# Patient Record
Sex: FEMALE | Race: WHITE | NOT HISPANIC OR LATINO | Employment: FULL TIME
[De-identification: names, ages, dates, MRNs, and addresses within clinical notes are randomized per-mention and may not be internally consistent; named-entity substitution may affect disease eponyms.]

---

## 2017-07-08 ENCOUNTER — HEALTH MAINTENANCE LETTER (OUTPATIENT)
Age: 57
End: 2017-07-08

## 2019-10-05 ENCOUNTER — HEALTH MAINTENANCE LETTER (OUTPATIENT)
Age: 59
End: 2019-10-05

## 2020-11-14 ENCOUNTER — HEALTH MAINTENANCE LETTER (OUTPATIENT)
Age: 60
End: 2020-11-14

## 2021-09-12 ENCOUNTER — HEALTH MAINTENANCE LETTER (OUTPATIENT)
Age: 61
End: 2021-09-12

## 2021-11-07 ENCOUNTER — HEALTH MAINTENANCE LETTER (OUTPATIENT)
Age: 61
End: 2021-11-07

## 2022-01-02 ENCOUNTER — HEALTH MAINTENANCE LETTER (OUTPATIENT)
Age: 62
End: 2022-01-02

## 2022-11-19 ENCOUNTER — HEALTH MAINTENANCE LETTER (OUTPATIENT)
Age: 62
End: 2022-11-19

## 2023-04-09 ENCOUNTER — HEALTH MAINTENANCE LETTER (OUTPATIENT)
Age: 63
End: 2023-04-09

## 2023-11-18 ENCOUNTER — HEALTH MAINTENANCE LETTER (OUTPATIENT)
Age: 63
End: 2023-11-18

## 2024-06-14 ENCOUNTER — TRANSFERRED RECORDS (OUTPATIENT)
Dept: MULTI SPECIALTY CLINIC | Facility: CLINIC | Age: 64
End: 2024-06-14

## 2024-06-14 LAB — RETINOPATHY: NORMAL

## 2024-07-07 ENCOUNTER — HOSPITAL ENCOUNTER (EMERGENCY)
Facility: HOSPITAL | Age: 64
Discharge: HOME | End: 2024-07-07
Attending: EMERGENCY MEDICINE | Admitting: EMERGENCY MEDICINE
Payer: COMMERCIAL

## 2024-07-07 ENCOUNTER — APPOINTMENT (OUTPATIENT)
Dept: CT IMAGING | Facility: HOSPITAL | Age: 64
End: 2024-07-07
Attending: EMERGENCY MEDICINE
Payer: COMMERCIAL

## 2024-07-07 VITALS
HEIGHT: 69 IN | WEIGHT: 272.1 LBS | RESPIRATION RATE: 18 BRPM | BODY MASS INDEX: 40.3 KG/M2 | OXYGEN SATURATION: 99 % | HEART RATE: 75 BPM | TEMPERATURE: 96.8 F | DIASTOLIC BLOOD PRESSURE: 80 MMHG | SYSTOLIC BLOOD PRESSURE: 159 MMHG

## 2024-07-07 DIAGNOSIS — R10.9 RIGHT FLANK PAIN: ICD-10-CM

## 2024-07-07 DIAGNOSIS — K86.89 PANCREATIC MASS: ICD-10-CM

## 2024-07-07 DIAGNOSIS — N20.1 URETERAL STONE: ICD-10-CM

## 2024-07-07 DIAGNOSIS — E11.9 TYPE 2 DIABETES MELLITUS WITHOUT COMPLICATION, WITHOUT LONG-TERM CURRENT USE OF INSULIN (H): ICD-10-CM

## 2024-07-07 LAB
ALBUMIN SERPL BCG-MCNC: 4.2 G/DL (ref 3.5–5.2)
ALBUMIN UR-MCNC: 50 MG/DL
ALP SERPL-CCNC: 76 U/L (ref 40–150)
ALT SERPL W P-5'-P-CCNC: 18 U/L (ref 0–50)
AMORPH CRY #/AREA URNS HPF: ABNORMAL /HPF
ANION GAP SERPL CALCULATED.3IONS-SCNC: 15 MMOL/L (ref 7–15)
APPEARANCE UR: ABNORMAL
AST SERPL W P-5'-P-CCNC: 17 U/L (ref 0–45)
BASOPHILS # BLD AUTO: 0.1 10E3/UL (ref 0–0.2)
BASOPHILS NFR BLD AUTO: 1 %
BILIRUB DIRECT SERPL-MCNC: <0.2 MG/DL (ref 0–0.3)
BILIRUB SERPL-MCNC: 0.5 MG/DL
BILIRUB UR QL STRIP: NEGATIVE
BUN SERPL-MCNC: 14.8 MG/DL (ref 8–23)
CALCIUM SERPL-MCNC: 9.3 MG/DL (ref 8.8–10.2)
CHLORIDE SERPL-SCNC: 104 MMOL/L (ref 98–107)
COLOR UR AUTO: YELLOW
CREAT SERPL-MCNC: 1.07 MG/DL (ref 0.51–0.95)
DEPRECATED HCO3 PLAS-SCNC: 22 MMOL/L (ref 22–29)
EGFRCR SERPLBLD CKD-EPI 2021: 58 ML/MIN/1.73M2
EOSINOPHIL # BLD AUTO: 0 10E3/UL (ref 0–0.7)
EOSINOPHIL NFR BLD AUTO: 0 %
ERYTHROCYTE [DISTWIDTH] IN BLOOD BY AUTOMATED COUNT: 13.5 % (ref 10–15)
GLUCOSE SERPL-MCNC: 316 MG/DL (ref 70–99)
GLUCOSE UR STRIP-MCNC: >1000 MG/DL
HBA1C MFR BLD: 8.7 %
HCT VFR BLD AUTO: 44.4 % (ref 35–47)
HGB BLD-MCNC: 14.9 G/DL (ref 11.7–15.7)
HGB UR QL STRIP: NEGATIVE
HYALINE CASTS: 10 /LPF
IMM GRANULOCYTES # BLD: 0.1 10E3/UL
IMM GRANULOCYTES NFR BLD: 1 %
KETONES UR STRIP-MCNC: NEGATIVE MG/DL
LEUKOCYTE ESTERASE UR QL STRIP: NEGATIVE
LIPASE SERPL-CCNC: 32 U/L (ref 13–60)
LYMPHOCYTES # BLD AUTO: 1 10E3/UL (ref 0.8–5.3)
LYMPHOCYTES NFR BLD AUTO: 9 %
MCH RBC QN AUTO: 30.3 PG (ref 26.5–33)
MCHC RBC AUTO-ENTMCNC: 33.6 G/DL (ref 31.5–36.5)
MCV RBC AUTO: 90 FL (ref 78–100)
MONOCYTES # BLD AUTO: 0.3 10E3/UL (ref 0–1.3)
MONOCYTES NFR BLD AUTO: 2 %
MUCOUS THREADS #/AREA URNS LPF: PRESENT /LPF
NEUTROPHILS # BLD AUTO: 10.7 10E3/UL (ref 1.6–8.3)
NEUTROPHILS NFR BLD AUTO: 88 %
NITRATE UR QL: NEGATIVE
NRBC # BLD AUTO: 0 10E3/UL
NRBC BLD AUTO-RTO: 0 /100
PH UR STRIP: 5.5 [PH] (ref 5–7)
PLATELET # BLD AUTO: 356 10E3/UL (ref 150–450)
POTASSIUM SERPL-SCNC: 4.2 MMOL/L (ref 3.4–5.3)
PROT SERPL-MCNC: 7.3 G/DL (ref 6.4–8.3)
RBC # BLD AUTO: 4.91 10E6/UL (ref 3.8–5.2)
RBC URINE: 0 /HPF
SODIUM SERPL-SCNC: 141 MMOL/L (ref 135–145)
SP GR UR STRIP: 1.03 (ref 1–1.03)
SQUAMOUS EPITHELIAL: 1 /HPF
UROBILINOGEN UR STRIP-MCNC: <2 MG/DL
WBC # BLD AUTO: 12.2 10E3/UL (ref 4–11)
WBC URINE: 0 /HPF

## 2024-07-07 PROCEDURE — 96375 TX/PRO/DX INJ NEW DRUG ADDON: CPT

## 2024-07-07 PROCEDURE — 80048 BASIC METABOLIC PNL TOTAL CA: CPT | Performed by: STUDENT IN AN ORGANIZED HEALTH CARE EDUCATION/TRAINING PROGRAM

## 2024-07-07 PROCEDURE — 74177 CT ABD & PELVIS W/CONTRAST: CPT

## 2024-07-07 PROCEDURE — 250N000011 HC RX IP 250 OP 636: Performed by: EMERGENCY MEDICINE

## 2024-07-07 PROCEDURE — 85025 COMPLETE CBC W/AUTO DIFF WBC: CPT | Performed by: EMERGENCY MEDICINE

## 2024-07-07 PROCEDURE — 80053 COMPREHEN METABOLIC PANEL: CPT | Performed by: STUDENT IN AN ORGANIZED HEALTH CARE EDUCATION/TRAINING PROGRAM

## 2024-07-07 PROCEDURE — 99285 EMERGENCY DEPT VISIT HI MDM: CPT | Mod: 25

## 2024-07-07 PROCEDURE — 81001 URINALYSIS AUTO W/SCOPE: CPT | Performed by: EMERGENCY MEDICINE

## 2024-07-07 PROCEDURE — 250N000013 HC RX MED GY IP 250 OP 250 PS 637: Performed by: EMERGENCY MEDICINE

## 2024-07-07 PROCEDURE — 80048 BASIC METABOLIC PNL TOTAL CA: CPT | Performed by: EMERGENCY MEDICINE

## 2024-07-07 PROCEDURE — 82248 BILIRUBIN DIRECT: CPT | Performed by: EMERGENCY MEDICINE

## 2024-07-07 PROCEDURE — 96374 THER/PROPH/DIAG INJ IV PUSH: CPT | Mod: 59

## 2024-07-07 PROCEDURE — 96361 HYDRATE IV INFUSION ADD-ON: CPT

## 2024-07-07 PROCEDURE — 36415 COLL VENOUS BLD VENIPUNCTURE: CPT | Performed by: STUDENT IN AN ORGANIZED HEALTH CARE EDUCATION/TRAINING PROGRAM

## 2024-07-07 PROCEDURE — 258N000003 HC RX IP 258 OP 636: Performed by: EMERGENCY MEDICINE

## 2024-07-07 PROCEDURE — 83036 HEMOGLOBIN GLYCOSYLATED A1C: CPT | Performed by: EMERGENCY MEDICINE

## 2024-07-07 PROCEDURE — 85025 COMPLETE CBC W/AUTO DIFF WBC: CPT | Performed by: STUDENT IN AN ORGANIZED HEALTH CARE EDUCATION/TRAINING PROGRAM

## 2024-07-07 PROCEDURE — 83690 ASSAY OF LIPASE: CPT | Performed by: EMERGENCY MEDICINE

## 2024-07-07 RX ORDER — METHOCARBAMOL 500 MG/1
500 TABLET, FILM COATED ORAL ONCE
Status: COMPLETED | OUTPATIENT
Start: 2024-07-07 | End: 2024-07-07

## 2024-07-07 RX ORDER — DIMENHYDRINATE 50 MG
50 TABLET ORAL ONCE
Status: COMPLETED | OUTPATIENT
Start: 2024-07-07 | End: 2024-07-07

## 2024-07-07 RX ORDER — DIMENHYDRINATE 50 MG
50 TABLET ORAL AT BEDTIME
Qty: 7 TABLET | Refills: 0 | Status: SHIPPED | OUTPATIENT
Start: 2024-07-07 | End: 2024-07-11

## 2024-07-07 RX ORDER — ACETAMINOPHEN 500 MG
1000 TABLET ORAL EVERY 6 HOURS
Qty: 56 TABLET | Refills: 0 | Status: SHIPPED | OUTPATIENT
Start: 2024-07-07 | End: 2024-08-07

## 2024-07-07 RX ORDER — IBUPROFEN 200 MG
400 TABLET ORAL EVERY 6 HOURS
Qty: 56 TABLET | Refills: 0 | Status: SHIPPED | OUTPATIENT
Start: 2024-07-07 | End: 2024-08-07

## 2024-07-07 RX ORDER — IOPAMIDOL 755 MG/ML
75 INJECTION, SOLUTION INTRAVASCULAR ONCE
Status: COMPLETED | OUTPATIENT
Start: 2024-07-07 | End: 2024-07-07

## 2024-07-07 RX ORDER — ONDANSETRON 2 MG/ML
4 INJECTION INTRAMUSCULAR; INTRAVENOUS ONCE
Status: COMPLETED | OUTPATIENT
Start: 2024-07-07 | End: 2024-07-07

## 2024-07-07 RX ORDER — ACETAMINOPHEN 325 MG/1
650 TABLET ORAL ONCE
Status: COMPLETED | OUTPATIENT
Start: 2024-07-07 | End: 2024-07-07

## 2024-07-07 RX ORDER — ACETAMINOPHEN 500 MG
1000 TABLET ORAL EVERY 6 HOURS
Qty: 56 TABLET | Refills: 0 | Status: SHIPPED | OUTPATIENT
Start: 2024-07-07 | End: 2024-07-07

## 2024-07-07 RX ORDER — DIMENHYDRINATE 50 MG
50 TABLET ORAL EVERY 6 HOURS PRN
Qty: 28 TABLET | Refills: 0 | Status: SHIPPED | OUTPATIENT
Start: 2024-07-07 | End: 2024-07-07

## 2024-07-07 RX ORDER — OXYCODONE HYDROCHLORIDE 5 MG/1
5 TABLET ORAL ONCE
Status: COMPLETED | OUTPATIENT
Start: 2024-07-07 | End: 2024-07-07

## 2024-07-07 RX ORDER — KETOROLAC TROMETHAMINE 15 MG/ML
15 INJECTION, SOLUTION INTRAMUSCULAR; INTRAVENOUS ONCE
Status: COMPLETED | OUTPATIENT
Start: 2024-07-07 | End: 2024-07-07

## 2024-07-07 RX ORDER — OXYCODONE HYDROCHLORIDE 5 MG/1
5 TABLET ORAL EVERY 6 HOURS PRN
Qty: 12 TABLET | Refills: 0 | Status: SHIPPED | OUTPATIENT
Start: 2024-07-07 | End: 2024-07-11

## 2024-07-07 RX ORDER — IBUPROFEN 200 MG
400 TABLET ORAL EVERY 6 HOURS
Qty: 56 TABLET | Refills: 0 | Status: SHIPPED | OUTPATIENT
Start: 2024-07-07 | End: 2024-07-07

## 2024-07-07 RX ORDER — DIMENHYDRINATE 50 MG
50 TABLET ORAL AT BEDTIME
Qty: 7 TABLET | Refills: 0 | Status: SHIPPED | OUTPATIENT
Start: 2024-07-07 | End: 2024-07-07

## 2024-07-07 RX ORDER — DIMENHYDRINATE 50 MG
50 TABLET ORAL EVERY 6 HOURS PRN
Qty: 28 TABLET | Refills: 0 | Status: SHIPPED | OUTPATIENT
Start: 2024-07-07 | End: 2024-07-11

## 2024-07-07 RX ADMIN — DIMENHYDRINATE 50 MG: 50 TABLET ORAL at 16:00

## 2024-07-07 RX ADMIN — IOPAMIDOL 75 ML: 755 INJECTION, SOLUTION INTRAVENOUS at 14:50

## 2024-07-07 RX ADMIN — ACETAMINOPHEN 650 MG: 325 TABLET ORAL at 16:00

## 2024-07-07 RX ADMIN — KETOROLAC TROMETHAMINE 15 MG: 15 INJECTION, SOLUTION INTRAMUSCULAR; INTRAVENOUS at 16:42

## 2024-07-07 RX ADMIN — OXYCODONE HYDROCHLORIDE 5 MG: 5 TABLET ORAL at 16:42

## 2024-07-07 RX ADMIN — METHOCARBAMOL 500 MG: 500 TABLET ORAL at 14:30

## 2024-07-07 RX ADMIN — SODIUM CHLORIDE 1000 ML: 9 INJECTION, SOLUTION INTRAVENOUS at 14:14

## 2024-07-07 RX ADMIN — ONDANSETRON 4 MG: 2 INJECTION INTRAMUSCULAR; INTRAVENOUS at 14:13

## 2024-07-07 ASSESSMENT — COLUMBIA-SUICIDE SEVERITY RATING SCALE - C-SSRS
6. HAVE YOU EVER DONE ANYTHING, STARTED TO DO ANYTHING, OR PREPARED TO DO ANYTHING TO END YOUR LIFE?: NO
2. HAVE YOU ACTUALLY HAD ANY THOUGHTS OF KILLING YOURSELF IN THE PAST MONTH?: NO
1. IN THE PAST MONTH, HAVE YOU WISHED YOU WERE DEAD OR WISHED YOU COULD GO TO SLEEP AND NOT WAKE UP?: NO

## 2024-07-07 ASSESSMENT — ACTIVITIES OF DAILY LIVING (ADL): ADLS_ACUITY_SCORE: 35

## 2024-07-07 NOTE — Clinical Note
Ingrid Oakes was seen and treated in our emergency department on 7/7/2024.  She may return to work on 07/15/2024.       If you have any questions or concerns, please don't hesitate to call.      Camille Posey MD

## 2024-07-07 NOTE — ED PROVIDER NOTES
EMERGENCY DEPARTMENT ENCOUNTER      NAME: Ingrid Oakes  AGE: 64 year old female  YOB: 1960  MRN: [id number]  EVALUATION DATE & TIME: No admission date for patient encounter.    PCP: No Ref-Primary, Physician    ED PROVIDER: Camille Posey M.D.      Chief Complaint   Patient presents with    Flank Pain         FINAL IMPRESSION:  1. Type 2 diabetes mellitus without complication, without long-term current use of insulin (H)    2. Right flank pain    3. Ureteral stone    4. Pancreatic mass          ED COURSE & MEDICAL DECISION MAKING:    ED Course as of 07/07/24 1637   Sun Jul 07, 2024   1325 Patient with right flank discomfort after cleaning, no MD f/u in about 15 years with morbid obesity, glucose 316 here in the ED without DKA with normal anion gap of 15 and CO2 22, I spoke with her about how this is apparen tdiabetes mellitus, she is familiar with pathophysiology of DM2 as her  has diabetes and she is educated re: the diagnosis, ok with plan to hydrate with IVF and begin metformin and reassess for tolerance, PMD referral. Okw ith plans to check LFT and lipase, CT abdomen and clinically reassess, UA when able to r/o UTI.   1344 Hemoglobin a1c 8.7 consistent with DM2, LFTs and lipase and UA underway   1504 UA without cells to suggest UTI reassuringly and LFTs and lipase nromal. Awaiting CT, which is underway   1554 CT with concerning pancreatic mass highly suspicious for neuroendocrine tumor, also passing ureteral stone which does explain right flank pain 2mm in distal right ureter. Dramamine and tylenol begun and will talk with patient now       Pertinent Labs & Imaging studies reviewed. (See chart for details)    N95 worn  A face shield was worn also  COVID PPE    Medical Decision Making  Obtained supplemental history:Supplemental history obtained?: No  Reviewed external records: External records reviewed?: No  Care impacted by chronic illness:Other: Obesity  Care significantly affected by  social determinants of health:Access to Medical Care  Did you consider but not order tests?: Work up considered but not performed and documented in chart, if applicable  Did you interpret images independently?: Independent interpretation of ECG and images noted in documentation, when applicable.  Consultation discussion with other provider:Did you involve another provider (consultant, , pharmacy, etc.)?: No  Discharge. I prescribed additional prescription strength medication(s) as charted. I considered admission, but discharged patient after significant clinical improvement.    At the conclusion of the encounter I discussed the results of all of the tests and the disposition. The questions were answered. The patient or family acknowledged understanding and was agreeable with the care plan.     MEDICATIONS GIVEN IN THE EMERGENCY:  Medications   metFORMIN (GLUCOPHAGE) tablet 500 mg (0 mg Oral Hold 7/7/24 1417)   ketorolac (TORADOL) injection 15 mg (has no administration in time range)   oxyCODONE (ROXICODONE) tablet 5 mg (has no administration in time range)   sodium chloride 0.9% BOLUS 1,000 mL (1,000 mLs Intravenous $New Bag 7/7/24 1414)   ondansetron (ZOFRAN) injection 4 mg (4 mg Intravenous $Given 7/7/24 1413)   methocarbamol (ROBAXIN) tablet 500 mg (500 mg Oral $Given 7/7/24 1430)   iopamidol (ISOVUE-370) solution 75 mL (75 mLs Intravenous $Given 7/7/24 1450)   acetaminophen (TYLENOL) tablet 650 mg (650 mg Oral $Given 7/7/24 1600)   dimenhyDRINATE (DRAMAMINE) tablet 50 mg (50 mg Oral $Given 7/7/24 1600)       NEW PRESCRIPTIONS STARTED AT TODAY'S ER VISIT  New Prescriptions    ACETAMINOPHEN (TYLENOL) 500 MG TABLET    Take 2 tablets (1,000 mg) by mouth every 6 hours    BLOOD GLUCOSE (NO BRAND SPECIFIED) TEST STRIP    Use to test blood sugar 2 times daily or as directed.    BLOOD GLUCOSE MONITORING (NO BRAND SPECIFIED) METER DEVICE KIT    Use to test blood sugar 2 times daily or as directed.    DIMENHYDRINATE  (DRAMAMINE) 50 MG TABLET    Take 1 tablet (50 mg) by mouth every 6 hours as needed for other (kidney stone pain management)    DIMENHYDRINATE (DRAMAMINE) 50 MG TABLET    Take 1 tablet (50 mg) by mouth at bedtime    IBUPROFEN (ADVIL/MOTRIN) 200 MG TABLET    Take 2 tablets (400 mg) by mouth every 6 hours    METFORMIN (GLUCOPHAGE) 500 MG TABLET    Take 1 tablet (500 mg) by mouth 2 times daily (with meals)    OXYCODONE (ROXICODONE) 5 MG TABLET    Take 1 tablet (5 mg) by mouth every 6 hours as needed for pain          =================================================================    HPI      Ingrid Oakes is a 64 year old female with PMHx of obesity, IBS who presents to the ED today via walk in with flank pain.     The patient reports she is just getting over covid from two weeks ago. She said this morning she developed low right sided flank pain, but believes it to be musculoskeletal in nature. She said she is unable to get rid of the pain, it is persistent-even after taking tylenol and ibuprofen. Sitting in the wheelchair is fine, but moving around makes her nauseas and diaphoretic. Pain is rated as 6/10. Patient with normal bowel movements today. Denies personal history of diabetes, as she has not seen a primary care provider in 15 years. Patient with family history of hypertension and diabetes.     Patient denies dysuria, hematuria, vomiting or fever.       REVIEW OF SYSTEMS   All other systems reviewed and are negative except as noted above in HPI.    PAST MEDICAL HISTORY:  History reviewed. No pertinent past medical history.    PAST SURGICAL HISTORY:  Past Surgical History:   Procedure Laterality Date    SURGICAL HISTORY OF -   1988    dermoid tumor bilateral ovaries    SURGICAL HISTORY OF -       c/section       CURRENT MEDICATIONS:    acetaminophen (TYLENOL) 500 MG tablet  blood glucose (NO BRAND SPECIFIED) test strip  blood glucose monitoring (NO BRAND SPECIFIED) meter device kit  dimenhyDRINATE (DRAMAMINE)  "50 MG tablet  dimenhyDRINATE (DRAMAMINE) 50 MG tablet  ibuprofen (ADVIL/MOTRIN) 200 MG tablet  metFORMIN (GLUCOPHAGE) 500 MG tablet  oxyCODONE (ROXICODONE) 5 MG tablet  Cholecalciferol (VITAMIN D PO)  SYNTHROID 150 MCG OR TABS  ZOLOFT 50 MG OR TABS  ZYRTEC 10 MG OR TABS        ALLERGIES:  Allergies   Allergen Reactions    Sulfa Antibiotics      Mother and brother allergic, she has not tried it       FAMILY HISTORY:  History reviewed. No pertinent family history.    SOCIAL HISTORY:   Social History     Socioeconomic History    Marital status:    Tobacco Use    Smoking status: Never   Substance and Sexual Activity    Alcohol use: Yes     Alcohol/week: 0.0 - 0.8 standard drinks of alcohol     Comment: per month    Drug use: No    Sexual activity: Yes     Partners: Male       VITALS:  Patient Vitals for the past 24 hrs:   BP Temp Pulse Resp SpO2 Height Weight   07/07/24 1556 (!) 157/78 -- 68 -- 100 % -- --   07/07/24 1225 (!) 153/78 96.8  F (36  C) 57 18 97 % 1.753 m (5' 9\") 123.4 kg (272 lb 1.6 oz)       PHYSICAL EXAM    GENERAL: Awake, alert.  In no acute distress.   HEENT: Normocephalic, atraumatic.  Pupils equal, round and reactive.  Conjunctiva normal.  EOMI.  NECK: No stridor or apparent deformity.  PULMONARY: Symmetrical breath sounds without distress.  Lungs clear to auscultation bilaterally without wheezes, rhonchi or rales.  CARDIO: Regular rate and rhythm.  No significant murmur, rub or gallop.  Radial pulses strong and symmetrical.  ABDOMINAL: Abdomen soft, non-distended, RUQ palpation reproduces right flank discomfort.  No CVAT, no palpable hepatosplenomegaly.  EXTREMITIES: No lower extremity swelling or edema.    NEURO: Alert and oriented to person, place and time.  Cranial nerves grossly intact.  No focal motor deficit.  PSYCH: Normal mood and affect  SKIN: No rashes      LAB:  All pertinent labs reviewed and interpreted.  Results for orders placed or performed during the hospital encounter of " 07/07/24   CT Abdomen Pelvis w Contrast    Impression    IMPRESSION:   1.  Enhancing mass of the pancreatic tail measuring 1.2 cm. Findings are concerning for a pancreatic neuroendocrine tumor. Recommend pancreatic MRI within one month.  2.  Obstructing 2 mm calculus in the right distal ureter at the ureterovesicular junction. There is mild right hydronephrosis and delayed enhancement of the right kidney.  3.  Hepatomegaly.   UA with Microscopic reflex to Culture    Specimen: Urine, Clean Catch   Result Value Ref Range    Color Urine Yellow Colorless, Straw, Light Yellow, Yellow    Appearance Urine Turbid (A) Clear    Glucose Urine >1000 (A) Negative mg/dL    Bilirubin Urine Negative Negative    Ketones Urine Negative Negative mg/dL    Specific Gravity Urine 1.030 1.001 - 1.030    Blood Urine Negative Negative    pH Urine 5.5 5.0 - 7.0    Protein Albumin Urine 50 (A) Negative mg/dL    Urobilinogen Urine <2.0 <2.0 mg/dL    Nitrite Urine Negative Negative    Leukocyte Esterase Urine Negative Negative    Mucus Urine Present (A) None Seen /LPF    Amorphous Crystals Urine Few (A) None Seen /HPF    RBC Urine 0 <=2 /HPF    WBC Urine 0 <=5 /HPF    Squamous Epithelials Urine 1 <=1 /HPF    Hyaline Casts Urine 10 (H) <=2 /LPF   Basic metabolic panel   Result Value Ref Range    Sodium 141 135 - 145 mmol/L    Potassium 4.2 3.4 - 5.3 mmol/L    Chloride 104 98 - 107 mmol/L    Carbon Dioxide (CO2) 22 22 - 29 mmol/L    Anion Gap 15 7 - 15 mmol/L    Urea Nitrogen 14.8 8.0 - 23.0 mg/dL    Creatinine 1.07 (H) 0.51 - 0.95 mg/dL    GFR Estimate 58 (L) >60 mL/min/1.73m2    Calcium 9.3 8.8 - 10.2 mg/dL    Glucose 316 (H) 70 - 99 mg/dL   CBC with platelets and differential   Result Value Ref Range    WBC Count 12.2 (H) 4.0 - 11.0 10e3/uL    RBC Count 4.91 3.80 - 5.20 10e6/uL    Hemoglobin 14.9 11.7 - 15.7 g/dL    Hematocrit 44.4 35.0 - 47.0 %    MCV 90 78 - 100 fL    MCH 30.3 26.5 - 33.0 pg    MCHC 33.6 31.5 - 36.5 g/dL    RDW 13.5 10.0 -  15.0 %    Platelet Count 356 150 - 450 10e3/uL    % Neutrophils 88 %    % Lymphocytes 9 %    % Monocytes 2 %    % Eosinophils 0 %    % Basophils 1 %    % Immature Granulocytes 1 %    NRBCs per 100 WBC 0 <1 /100    Absolute Neutrophils 10.7 (H) 1.6 - 8.3 10e3/uL    Absolute Lymphocytes 1.0 0.8 - 5.3 10e3/uL    Absolute Monocytes 0.3 0.0 - 1.3 10e3/uL    Absolute Eosinophils 0.0 0.0 - 0.7 10e3/uL    Absolute Basophils 0.1 0.0 - 0.2 10e3/uL    Absolute Immature Granulocytes 0.1 <=0.4 10e3/uL    Absolute NRBCs 0.0 10e3/uL   Result Value Ref Range    Hemoglobin A1C 8.7 (H) <5.7 %   Result Value Ref Range    Lipase 32 13 - 60 U/L   Hepatic function panel   Result Value Ref Range    Protein Total 7.3 6.4 - 8.3 g/dL    Albumin 4.2 3.5 - 5.2 g/dL    Bilirubin Total 0.5 <=1.2 mg/dL    Alkaline Phosphatase 76 40 - 150 U/L    AST 17 0 - 45 U/L    ALT 18 0 - 50 U/L    Bilirubin Direct <0.20 0.00 - 0.30 mg/dL       RADIOLOGY:  Reviewed all pertinent imaging. Please see official radiology report.  CT Abdomen Pelvis w Contrast   Final Result   IMPRESSION:    1.  Enhancing mass of the pancreatic tail measuring 1.2 cm. Findings are concerning for a pancreatic neuroendocrine tumor. Recommend pancreatic MRI within one month.   2.  Obstructing 2 mm calculus in the right distal ureter at the ureterovesicular junction. There is mild right hydronephrosis and delayed enhancement of the right kidney.   3.  Hepatomegaly.              I, Chen Villanueva, am serving as a scribe to document services personally performed by Dr. Camille Posey based on my observation and the provider's statements to me. I, Camlile Posey MD attest that Chen Villanueva is acting in a scribe capacity, has observed my performance of the services and has documented them in accordance with my direction.       Camille Posey MD  07/07/24 3475

## 2024-07-07 NOTE — ED TRIAGE NOTES
"Pt to triage via w/c c/o nonradiating right posterior flank pain since waking at 0900 with associated n/v. Pt reports subjective fever/chills, and reports urinating \"a lot\" but denies dysuria or hematuria. Pt states she was cleaning yesterday and thought maybe she pulled a muscle in her back but the pain seemed too severe.     Triage Assessment (Adult)       Row Name 07/07/24 2565          Triage Assessment    Airway WDL WDL        Respiratory WDL    Respiratory WDL WDL        Peripheral/Neurovascular WDL    Peripheral Neurovascular WDL WDL        Cognitive/Neuro/Behavioral WDL    Cognitive/Neuro/Behavioral WDL WDL                     "

## 2024-07-07 NOTE — DISCHARGE INSTRUCTIONS
You have a kidney stone and so we are giving you prescription medications to keep that pain low while your stone is passing and our kidney stone experts will call you to help you to set up an appointment with them and so they can make sure your stone passed successfully.    On the CAT scan we can see you have a pancreatic mass. I am worried this could be cancer. Our cancer specialists will call you tomorrow to help you to set up an appointment with them in their office in the next 2 days so they can talk to you about treatment options and what testing they want to start this week to help to determine what type of cancer this may be.    Please check your blood sugar once every morning and once every evening and write those numbers down so when you follow up with the primary care medical team they can make sure that over time your blood sugar numbers are improving while you are taking the metformin medication for your newly diagnosed diabetes. They may increase your metformin dose at their office. Our primary care medical team will call you to help you to set up a follow up appointment with them in their office within the next two weeks. I expect your blood sugar will be elevated initially while you are taking the metformin medication because it can take some time before it works well. If your blood sugar gets high over 500 please come to the ER so we can increase your medication dose.

## 2024-07-09 ENCOUNTER — TELEPHONE (OUTPATIENT)
Dept: GASTROENTEROLOGY | Facility: CLINIC | Age: 64
End: 2024-07-09
Payer: COMMERCIAL

## 2024-07-09 ENCOUNTER — PATIENT OUTREACH (OUTPATIENT)
Dept: ONCOLOGY | Facility: CLINIC | Age: 64
End: 2024-07-09
Payer: COMMERCIAL

## 2024-07-09 DIAGNOSIS — K86.89 PANCREATIC MASS: Primary | ICD-10-CM

## 2024-07-09 NOTE — PROGRESS NOTES
New Patient Oncology Nurse Navigator Note     Referring provider: Dr Camille Posey, ED [facility]    Referring Clinic/Organization: [facility]     Referred to: Medical Oncology    Requested provider (if applicable): First available - did not specify     Referral Received: 07/08/24       Evaluation for : CT concerning for pancreatic malignancy     Clinical History (per Nurse review of records provided):      7/7/2024 Pt presented to ED [facility] for flank pain.       7/7/2024 CT abd/pelvis  IMPRESSION:   1.  Enhancing mass of the pancreatic tail measuring 1.2 cm. Findings are concerning for a pancreatic neuroendocrine tumor. Recommend pancreatic MRI within one month.  2.  Obstructing 2 mm calculus in the right distal ureter at the ureterovesicular junction. There is mild right hydronephrosis and delayed enhancement of the right kidney.  3.  Hepatomegaly.    Admission on 07/07/2024, Discharged on 07/07/2024   Component Date Value Ref Range Status    Color Urine 07/07/2024 Yellow  Colorless, Straw, Light Yellow, Yellow Final    Appearance Urine 07/07/2024 Turbid (A)  Clear Final    Glucose Urine 07/07/2024 >1000 (A)  Negative mg/dL Final    Bilirubin Urine 07/07/2024 Negative  Negative Final    Ketones Urine 07/07/2024 Negative  Negative mg/dL Final    Specific Gravity Urine 07/07/2024 1.030  1.001 - 1.030 Final    Blood Urine 07/07/2024 Negative  Negative Final    pH Urine 07/07/2024 5.5  5.0 - 7.0 Final    Protein Albumin Urine 07/07/2024 50 (A)  Negative mg/dL Final    Urobilinogen Urine 07/07/2024 <2.0  <2.0 mg/dL Final    Nitrite Urine 07/07/2024 Negative  Negative Final    Leukocyte Esterase Urine 07/07/2024 Negative  Negative Final    Mucus Urine 07/07/2024 Present (A)  None Seen /LPF Final    Amorphous Crystals Urine 07/07/2024 Few (A)  None Seen /HPF Final    RBC Urine 07/07/2024 0  <=2 /HPF Final    WBC Urine 07/07/2024 0  <=5 /HPF Final    Squamous Epithelials Urine 07/07/2024 1  <=1  /HPF Final    Hyaline Casts Urine 07/07/2024 10 (H)  <=2 /LPF Final    Sodium 07/07/2024 141  135 - 145 mmol/L Final    Potassium 07/07/2024 4.2  3.4 - 5.3 mmol/L Final    Chloride 07/07/2024 104  98 - 107 mmol/L Final    Carbon Dioxide (CO2) 07/07/2024 22  22 - 29 mmol/L Final    Anion Gap 07/07/2024 15  7 - 15 mmol/L Final    Urea Nitrogen 07/07/2024 14.8  8.0 - 23.0 mg/dL Final    Creatinine 07/07/2024 1.07 (H)  0.51 - 0.95 mg/dL Final    GFR Estimate 07/07/2024 58 (L)  >60 mL/min/1.73m2 Final    eGFR calculated using 2021 CKD-EPI equation.    Calcium 07/07/2024 9.3  8.8 - 10.2 mg/dL Final    Glucose 07/07/2024 316 (H)  70 - 99 mg/dL Final    WBC Count 07/07/2024 12.2 (H)  4.0 - 11.0 10e3/uL Final    RBC Count 07/07/2024 4.91  3.80 - 5.20 10e6/uL Final    Hemoglobin 07/07/2024 14.9  11.7 - 15.7 g/dL Final    Hematocrit 07/07/2024 44.4  35.0 - 47.0 % Final    MCV 07/07/2024 90  78 - 100 fL Final    MCH 07/07/2024 30.3  26.5 - 33.0 pg Final    MCHC 07/07/2024 33.6  31.5 - 36.5 g/dL Final    RDW 07/07/2024 13.5  10.0 - 15.0 % Final    Platelet Count 07/07/2024 356  150 - 450 10e3/uL Final    % Neutrophils 07/07/2024 88  % Final    % Lymphocytes 07/07/2024 9  % Final    % Monocytes 07/07/2024 2  % Final    % Eosinophils 07/07/2024 0  % Final    % Basophils 07/07/2024 1  % Final    % Immature Granulocytes 07/07/2024 1  % Final    NRBCs per 100 WBC 07/07/2024 0  <1 /100 Final    Absolute Neutrophils 07/07/2024 10.7 (H)  1.6 - 8.3 10e3/uL Final    Absolute Lymphocytes 07/07/2024 1.0  0.8 - 5.3 10e3/uL Final    Absolute Monocytes 07/07/2024 0.3  0.0 - 1.3 10e3/uL Final    Absolute Eosinophils 07/07/2024 0.0  0.0 - 0.7 10e3/uL Final    Absolute Basophils 07/07/2024 0.1  0.0 - 0.2 10e3/uL Final    Absolute Immature Granulocytes 07/07/2024 0.1  <=0.4 10e3/uL Final    Absolute NRBCs 07/07/2024 0.0  10e3/uL Final    Hemoglobin A1C 07/07/2024 8.7 (H)  <5.7 % Final    Normal <5.7%   Prediabetes 5.7-6.4%    Diabetes 6.5% or  higher     Note: Adopted from ADA consensus guidelines.    Lipase 07/07/2024 32  13 - 60 U/L Final    Protein Total 07/07/2024 7.3  6.4 - 8.3 g/dL Final    Albumin 07/07/2024 4.2  3.5 - 5.2 g/dL Final    Bilirubin Total 07/07/2024 0.5  <=1.2 mg/dL Final    Alkaline Phosphatase 07/07/2024 76  40 - 150 U/L Final    AST 07/07/2024 17  0 - 45 U/L Final    Reference intervals for this test were updated on 6/12/2023 to more accurately reflect our healthy population. There may be differences in the flagging of prior results with similar values performed with this method. Interpretation of those prior results can be made in the context of the updated reference intervals.    ALT 07/07/2024 18  0 - 50 U/L Final    Reference intervals for this test were updated on 6/12/2023 to more accurately reflect our healthy population. There may be differences in the flagging of prior results with similar values performed with this method. Interpretation of those prior results can be made in the context of the updated reference intervals.      Bilirubin Direct 07/07/2024 <0.20  0.00 - 0.30 mg/dL Final         Clinical Assessment / Barriers to Care (Per Nurse):    ED is referring to Onc for further evaluation and work up of the pancreatic mass. Pt lives in [place], first available Onc at [facility] is 2 wks away. Requested review by [facility] (Dr Mcpherson this week) about recommendations. He advises EUS biopsy of pancreatic mass prior to seeing Oncology.    I have called pt's cell/home#  [phone] and left voicemail requesting a call back. Will discuss if pt is willing to get EUS pancreatic biopsy prior to Onc appt.    Pt has no current PCP, ED entered PCP referral and pt has appt with a new PCP 8/7/24 Dr Humphreys.       Records Location: [facility]     Records Needed:     N/A    Additional testing needed prior to consult:     EUS pancreatic mass biopsy preferred    Referral updates and Plan:     Addendum 7/9: Pt calls  back immediately. She is agreeable to do EUS pancreas biopsy prior to seeing Oncology for consult. She will prefer [facility] if biopsy confirms malignancy. GI referral entered, they will call pt directly to arrange biopsy. I will coordinate Onc appt a few days after biopsy date. Pt agrees & verbalizes understanding of this plan.      Vitaly De La Torre, RN, BSN, OCN  Oncology New Patient Nurse Navigator   [facility]  [phone]

## 2024-07-09 NOTE — TELEPHONE ENCOUNTER
"Advanced Endoscopy     Referring provider:  Dean Mcpherson MD     Referred to: [facility] - original referral to med onc with request for \"emergency priority\"    Provider Requested: none specified     Referral Received: 7/9/24     Records received:  [facility]    CT abd/pelvis with contrast 7/7/24    IMPRESSION:   1.  Enhancing mass of the pancreatic tail measuring 1.2 cm. Findings are concerning for a pancreatic neuroendocrine tumor. Recommend pancreatic MRI within one month.  2.  Obstructing 2 mm calculus in the right distal ureter at the ureterovesicular junction. There is mild right hydronephrosis and delayed enhancement of the right kidney.  3.  Hepatomegaly.   Latest Reference Range & Units 07/07/24 12:36   Alkaline Phosphatase 40 - 150 U/L 76   ALT 0 - 50 U/L 18   AST 0 - 45 U/L 17   Bilirubin Direct 0.00 - 0.30 mg/dL <0.20   Bilirubin Total <=1.2 mg/dL 0.5   Glucose 70 - 99 mg/dL 316 (H)   Hemoglobin A1C <5.7 % 8.7 (H)   Lipase 13 - 60 U/L 32      Images received:  PACs    Insurance Coverage:  AET[employer]    Evaluation for:  Pancreatic mass     Clinical History (per RN review):    Oncology pt outreach 7/9/24  Requested review by [facility] (Dr Mcpherson this week) about recommendations. He advises EUS biopsy of pancreatic mass prior to seeing Oncology. I have called pt's cell/home#  [phone] and left voicemail requesting a call back. Will discuss if pt is willing to get EUS pancreatic biopsy prior to Onc appt.     Pt has no current PCP, ED entered PCP referral and pt has appt with a new PCP 8/7/24 Dr Humphreys.     ED visit 7/7/24   Ingrid Oakes is a 64 year old female with PMHx of obesity, IBS who presents to the ED today via walk in with flank pain.      The patient reports she is just getting over covid from two weeks ago. She said this morning she developed low right sided flank pain, but believes it to be musculoskeletal in nature. She said she is unable to get rid of the pain, it " is persistent-even after taking tylenol and ibuprofen. Sitting in the wheelchair is fine, but moving around makes her nauseas and diaphoretic. Pain is rated as 6/10. Patient with normal bowel movements today. Denies personal history of diabetes, as she has not seen a primary care provider in 15 years. Patient with family history of hypertension and diabetes.      Patient denies dysuria, hematuria, vomiting or fever.     MD review date:   MD Decision for clinic consultation/Orders:            Referral updates/Patient contacted:

## 2024-07-10 ENCOUNTER — PATIENT OUTREACH (OUTPATIENT)
Dept: GASTROENTEROLOGY | Facility: CLINIC | Age: 64
End: 2024-07-10
Payer: COMMERCIAL

## 2024-07-10 ENCOUNTER — PREP FOR PROCEDURE (OUTPATIENT)
Dept: GASTROENTEROLOGY | Facility: CLINIC | Age: 64
End: 2024-07-10
Payer: COMMERCIAL

## 2024-07-10 DIAGNOSIS — R93.89 ABNORMAL FINDING ON IMAGING: Primary | ICD-10-CM

## 2024-07-10 NOTE — TELEPHONE ENCOUNTER
Called pt to discuss referral and Dr Michael's recommendations    Procedure/Imaging/Clinic: Upper EUS   Physician: Fernanda   Timing: within 2-3 weeks   Scope time needed: 20 min  Dx: Enhancing pancreatic mass on CT   Tier:Tier 2 - Not life/limb threatening but potential for  patient morbidity/mortality or adverse  patient/disease outcome if  surgery/procedure not done within 30 day   Location: [facility]. OR is too delayed.   Header of letter for pt communication:   Examination of the pancreas with ultrasound from inside the stomach, with biopsy.     Comments: Dr Mcpherson    7/19 procedure in OR as next available. Pt in agreement.     Explained OR will call 1-2 days prior to procedure date with arrival time, will need a , someone to stay with them for 24 hours and should stay in town for 24 hours (within 45 min of Hospital) post procedure    Patient needs to get pre-op physical completed. If outside [facility] system will need physical faxed to number [phone]     If you do not get a preop physical, your procedure could be cancelled, patient voiced understanding*    Preop Plan: Virtual pre op exam scheduled on 7/11 at 11am    Does patient have any history of gastric bypass/gastric surgery/altered panc/bili anatomy? none    Does patient have [employer] insurance?:AETNA    Med Review    Blood thinner -  none   ASA -  none  Diabetic - metformin BID, hold day of procedure   Any meds by injection or mouth for weight loss or diabetes- none    Patient Education r/t procedure: mychart    A pre-op nurse will call 1-2 days prior to the procedure.    NPO/Prep:   Adults and Children of all ages may consume solids up to 8 hours prior to arrival time - may consume clear liquids up to 1 hour prior to arrival time.    Verbalized understanding of all instructions. All questions answered.     Procedure order placed, message routed to OR      Shelly Jimenez, RN, BSN,   [facility]  Care coordinator

## 2024-07-10 NOTE — PROGRESS NOTES
Pt's EUS pancreas biopsy is 7/19/2024, will offer Onc consult w/ Dr Cervantes 7/24/2024 1pm, in person, at [facility] to review bx findings.    Vitaly De La Torre RN  Oncology Nurse Navigator  [facility]  [phone]

## 2024-07-11 ENCOUNTER — VIRTUAL VISIT (OUTPATIENT)
Dept: SURGERY | Facility: CLINIC | Age: 64
End: 2024-07-11
Payer: COMMERCIAL

## 2024-07-11 ENCOUNTER — PRE VISIT (OUTPATIENT)
Dept: SURGERY | Facility: CLINIC | Age: 64
End: 2024-07-11

## 2024-07-11 ENCOUNTER — ANESTHESIA EVENT (OUTPATIENT)
Dept: SURGERY | Facility: CLINIC | Age: 64
End: 2024-07-11
Payer: COMMERCIAL

## 2024-07-11 VITALS — HEIGHT: 69 IN | BODY MASS INDEX: 40.29 KG/M2 | WEIGHT: 272 LBS

## 2024-07-11 DIAGNOSIS — R93.89 ABNORMAL FINDING ON IMAGING: ICD-10-CM

## 2024-07-11 DIAGNOSIS — Z01.818 PREOP EXAMINATION: Primary | ICD-10-CM

## 2024-07-11 PROCEDURE — 99203 OFFICE O/P NEW LOW 30 MIN: CPT | Mod: 95 | Performed by: CLINICAL NURSE SPECIALIST

## 2024-07-11 RX ORDER — LYSINE HCL 500 MG
500 TABLET ORAL
COMMUNITY

## 2024-07-11 ASSESSMENT — PAIN SCALES - GENERAL: PAINLEVEL: NO PAIN (0)

## 2024-07-11 ASSESSMENT — ENCOUNTER SYMPTOMS
DYSRHYTHMIAS: 0
SEIZURES: 0

## 2024-07-11 ASSESSMENT — LIFESTYLE VARIABLES: TOBACCO_USE: 0

## 2024-07-11 NOTE — H&P
Pre-Operative H & P     CC:  Preoperative exam to assess for increased cardiopulmonary risk while undergoing surgery and anesthesia.    Date of Encounter: 7/11/2024  Primary Care Physician:  No Ref-Primary, Physician     Reason for visit:   Encounter Diagnoses   Name Primary?    Preop examination Yes    Abnormal finding on imaging        HPI  Ingrid Oakes is a 64 year old female who presents for pre-operative H & P in preparation for  Procedure Information       Case: [id number] Date/Time: 07/19/24 0945    Procedure: ENDOSCOPIC ULTRASOUND, ESOPHAGOSCOPY / UPPER GASTROINTESTINAL TRACT (GI) (Esophagus)    Anesthesia type: MAC    Diagnosis: Abnormal finding on imaging [R93.89]    Pre-op diagnosis: Abnormal finding on imaging [R93.89]    Location: [facility] OR 18 / [facility] OR    Providers: Terell Michael MD          History is obtained from the patient and chart review    Patient with history of recent COVID infection, and new onset right-sided flank pain. It persisted to the point that she was seen in the ED. Imaging revealed an enhancing pancreatic mass on CT. She was referred to the advanced endoscopy team and has been counseled for above procedure.    Her history is otherwise significant for obesity, IBS, hypertension, RENE hypothyroidism, and depression. Patient was also diagnosed with kidney stone during ED visit, which she thinks has passed, and new DIABETES MELLITUS. She was started on metformin.     Hx of abnormal bleeding or anti-platelet use: Denies     Menstrual history: No LMP recorded. Patient is postmenopausal.     Past Medical History  Past Medical History:   Diagnosis Date    Abnormal finding on imaging     Hypothyroidism     Irritable bowel syndrome     Mild recurrent major depression (H24)     Nephrolithiasis     RENE (obstructive sleep apnea)     Type 2 diabetes mellitus without complication (H)        Past Surgical History  Past Surgical History:   Procedure Laterality Date    SURGICAL HISTORY OF -    1988    dermoid tumor bilateral ovaries    SURGICAL HISTORY OF -       c/section       Prior to Admission Medications  Current Outpatient Medications   Medication Sig Dispense Refill    acetaminophen (TYLENOL) 500 MG tablet Take 2 tablets (1,000 mg) by mouth every 6 hours 56 tablet 0    Cholecalciferol (VITAMIN D PO) Take 1 tablet by mouth every evening      ibuprofen (ADVIL/MOTRIN) 200 MG tablet Take 2 tablets (400 mg) by mouth every 6 hours 56 tablet 0    l-lysine HCl 500 MG TABS tablet Take 500 mg by mouth nightly as needed      metFORMIN (GLUCOPHAGE) 500 MG tablet Take 1 tablet (500 mg) by mouth 2 times daily (with meals) 60 tablet 0    SYNTHROID 150 MCG OR TABS Take 200 mcg by mouth every evening      ZOLOFT 50 MG OR TABS Take 50 mg by mouth every evening      blood glucose (NO BRAND SPECIFIED) test strip Use to test blood sugar 2 times daily or as directed. 100 strip 0    blood glucose monitoring (NO BRAND SPECIFIED) meter device kit Use to test blood sugar 2 times daily or as directed. 1 kit 0       Allergies  Allergies   Allergen Reactions    Sulfa Antibiotics      Mother and brother allergic, she has not tried it       Social History  Social History     Socioeconomic History    Marital status:      Spouse name: Not on file    Number of children: Not on file    Years of education: Not on file    Highest education level: Not on file   Occupational History    Not on file   Tobacco Use    Smoking status: Never    Smokeless tobacco: Not on file   Substance and Sexual Activity    Alcohol use: Yes     Alcohol/week: 1.0 - 2.0 standard drink of alcohol     Types: 1 Standard drinks or equivalent per week     Comment: per month    Drug use: No    Sexual activity: Yes     Partners: Male   Other Topics Concern    Not on file   Social History Narrative    Not on file     Social Determinants of Health     Financial Resource Strain: Not on file   Food Insecurity: Not on file   Transportation Needs: Not on file    Physical Activity: Not on file   Stress: Not on file   Social Connections: Not on file   Interpersonal Safety: Not on file   Housing Stability: Not on file       Family History  Family History   Problem Relation Age of Onset    Anesthesia Reaction Mother         Difficulty waking. Was paranoid    Lymphoma Mother     Thyroid Disease Mother     Hypertension Father     Diabetes Brother     Hypertension Brother     Depression Brother     Osteoporosis Paternal Grandmother     Diabetes Maternal Aunt     Lymphoma Maternal Aunt     Thyroid Cancer Cousin     Colon Cancer Maternal Uncle     Breast Cancer Paternal Aunt     Bleeding Disorder No family hx of     Clotting Disorder No family hx of        Review of Systems  The complete review of systems is negative other than noted in the HPI or here.   Anesthesia Evaluation   Pt has had prior anesthetic. Type: General.    No history of anesthetic complications       ROS/MED HX  ENT/Pulmonary: Comment: Uses dental appliance    (+) sleep apnea, mild, doesn't use CPAP,                                   (-) tobacco use and recent URI   Neurologic:    (-) no seizures and no CVA   Cardiovascular:     (+)  - -   -  - -                                 Previous cardiac testing   Echo: Date: Results:    Stress Test:  Date: Results:    ECG Reviewed:  Date: Last 2011 Results:  NSR  Cath:  Date: Results:   (-) taking anticoagulants/antiplatelets, ARTEAGA and arrhythmias   METS/Exercise Tolerance: >4 METS Comment: Works in [employer], involving a lot of walking   Hematologic:  - neg hematologic  ROS  (-) history of blood clots and history of blood transfusion   Musculoskeletal:  - neg musculoskeletal ROS     GI/Hepatic: Comment: IBS   (-) GERD   Renal/Genitourinary:     (+)       Nephrolithiasis ,    (-) renal disease   Endo: Comment: Newly diagnosed in ED. Has started DIABETES MELLITUS diet, and BS is slowly going down. On metformin     (+) type I DM, type II DM, Last HgA1c: 8.7, date:  7/7/24, Not using insulin, - not using insulin pump. Normal glucose range: Fasting 174,   thyroid problem, hypothyroidism,    Obesity,       Psychiatric/Substance Use:     (+) psychiatric history depression       Infectious Disease:  - neg infectious disease ROS     Malignancy:  - neg malignancy ROS     Other:  - neg other ROS          Virtual visit -  No vitals were obtained    Physical Exam  Constitutional: Awake, alert, no apparent distress, and appears stated age.  HENT: Normocephalic  Respiratory: non labored breathing: no cough   Neurologic: Oriented to name, place and time.   Neuropsychiatric: Calm, cooperative. Normal affect.      Prior Labs/Diagnostic Studies   All labs and imaging personally reviewed   Lab Results   Component Value Date    WBC 12.2 07/07/2024     Lab Results   Component Value Date    RBC 4.91 07/07/2024     Lab Results   Component Value Date    HGB 14.9 07/07/2024     Lab Results   Component Value Date    HCT 44.4 07/07/2024     Lab Results   Component Value Date    MCV 90 07/07/2024     Lab Results   Component Value Date    MCH 30.3 07/07/2024     Lab Results   Component Value Date    MCHC 33.6 07/07/2024     Lab Results   Component Value Date    RDW 13.5 07/07/2024     Lab Results   Component Value Date     07/07/2024     Last Comprehensive Metabolic Panel:  Sodium   Date Value Ref Range Status   07/07/2024 141 135 - 145 mmol/L Final     Potassium   Date Value Ref Range Status   07/07/2024 4.2 3.4 - 5.3 mmol/L Final     Chloride   Date Value Ref Range Status   07/07/2024 104 98 - 107 mmol/L Final     Carbon Dioxide (CO2)   Date Value Ref Range Status   07/07/2024 22 22 - 29 mmol/L Final     Anion Gap   Date Value Ref Range Status   07/07/2024 15 7 - 15 mmol/L Final     Glucose   Date Value Ref Range Status   07/07/2024 316 (H) 70 - 99 mg/dL Final     Urea Nitrogen   Date Value Ref Range Status   07/07/2024 14.8 8.0 - 23.0 mg/dL Final     Creatinine   Date Value Ref Range Status    2024 1.07 (H) 0.51 - 0.95 mg/dL Final     GFR Estimate   Date Value Ref Range Status   2024 58 (L) >60 mL/min/1.73m2 Final     Comment:     eGFR calculated using  CKD-EPI equation.     Calcium   Date Value Ref Range Status   2024 9.3 8.8 - 10.2 mg/dL Final     Bilirubin Total   Date Value Ref Range Status   2024 0.5 <=1.2 mg/dL Final     Alkaline Phosphatase   Date Value Ref Range Status   2024 76 40 - 150 U/L Final     ALT   Date Value Ref Range Status   2024 18 0 - 50 U/L Final     Comment:     Reference intervals for this test were updated on 2023 to more accurately reflect our healthy population. There may be differences in the flagging of prior results with similar values performed with this method. Interpretation of those prior results can be made in the context of the updated reference intervals.       AST   Date Value Ref Range Status   2024 17 0 - 45 U/L Final     Comment:     Reference intervals for this test were updated on 2023 to more accurately reflect our healthy population. There may be differences in the flagging of prior results with similar values performed with this method. Interpretation of those prior results can be made in the context of the updated reference intervals.       EK Normal sinus rhythm    CT abd/pelvis 24                                                   IMPRESSION:   1.  Enhancing mass of the pancreatic tail measuring 1.2 cm. Findings are concerning for a pancreatic neuroendocrine tumor. Recommend pancreatic MRI within one month.  2.  Obstructing 2 mm calculus in the right distal ureter at the ureterovesicular junction. There is mild right hydronephrosis and delayed enhancement of the right kidney.  3.  Hepatomegaly.    The patient's records and results personally reviewed by this provider.     Outside records reviewed from: Care Everywhere    Assessment    Ingrid Oakes is a 64 year old female seen as a PAC  "referral for risk assessment and optimization for anesthesia.    Plan/Recommendations  Pt will be optimized for the proposed procedure.  See below for details on the assessment, risk, and preoperative recommendations    NEUROLOGY  - No history of TIA, CVA or seizure    -Post Op delirium risk factors:  No risk identified    ENT  - No current airway concerns.  Will need to be reassessed day of surgery.  Mallampati: Unable to assess  TM: Unable to assess    CARDIAC  No cardiac history, symptoms or meds. Good activity tolerance. Works in manufacturing.   - METS (Metabolic Equivalents)>4    RCRI: 0.4% risk of serious cardiac events    PULMONARY  Denies asthma, cough or use of inhaler  Mild RENE with use of dental appliance  Able to lie flat without difficulty    - Tobacco History    History   Smoking Status    Never   Smokeless Tobacco    Not on file       GI: Denies GERD  No abd pain at this time  PONV High Risk  Total Score: 3           1 AN PONV: Pt is Female    1 AN PONV: Patient is not a current smoker    1 AN PONV: Intended Post Op Opioids        /RENAL  - Baseline Creatinine  1.07 in ED  Recent kidney stone she believes has passed. Denies flank pain    ENDOCRINE    - BMI: Estimated body mass index is 40.17 kg/m  as calculated from the following:    Height as of this encounter: 1.753 m (5' 9\").    Weight as of this encounter: 123.4 kg (272 lb).  Class 3 Obesity (BMI > 40)  Newly diagnosed DIABETES MELLITUS II. A1C 8.7 in ED. Was started on metformin and has been following DIABETES MELLITUS diet. Will hold metformin on DOS. Average fasting BS at home now 174, down from 182.  Hypothyroidism. Synthroid at HS    HEME  VTE Low Risk 0.26%             Total Score: 0      Denies personal or family history of blood clots  Denies personal or family history of bleeding disorder  Denies history of blood transfusion   Hgb 14.9    MSK: Some back pain attributed to obesity    PSYCH  - Depression. Zoloft at HS    Different " anesthesia methods/types have been discussed with the patient, but they are aware that the final plan will be decided by the assigned anesthesia provider on the date of service.    The patient is optimized for their procedure. AVS with information on surgery time/arrival time, meds and NPO status given by nursing staff. No further diagnostic testing indicated.    Please refer to the physical examination documented by the anesthesiologist in the anesthesia record on the day of surgery.    Video-Visit Details    Type of service:  Video Visit    Provider received verbal consent for a Video Visit from the patient? Yes     Originating Location (pt. Location): Home    Distant Location (provider location):  Off-site  Mode of Communication:  Video Conference via [employer]  On the day of service:     Prep time: 15 minutes  Visit time: 10 minutes  Documentation time: 14 minutes  ------------------------------------------  Total time: 39 minutes      TYRONE Diaz CNS  Preoperative Assessment Center  [facility]  Clinic and Surgery Center  Phone: [phone]  Fax: [phone]

## 2024-07-11 NOTE — PROGRESS NOTES
Ingrid is a 64 year old who is being evaluated via a billable video visit.    How would you like to obtain your AVS? MyChart  If the video visit is dropped, the invitation should be resent by: Text to cell phone: [phone]      Subjective   Ingrid is a 64 year old, presenting for the following health issues:  Pre-Op Exam    HPI           Physical Exam

## 2024-07-11 NOTE — PATIENT INSTRUCTIONS
Preparing for Your Surgery      Name:  Ingrid Oakes   MRN:  [id number]   :  1960   Today's Date:  2024       Arriving for surgery:  Surgery date:  24  Arrival time:  7:45 am    Please come to:     [email or web address]    [address]  [address]     The [facility] ([facility] [place] Patient/Visitor Ramp is at [address]. Patients and visitors who self-park will receive the reduced hospital parking rate. If the Patient /Visitor Ramp is full, please follow the signs to the [facility] car park located at the main hospital entrance.       parking is available (24 hours/ 7 days a week)      Discounted parking pass options are available for patients and visitors. They can be purchased at the [employer] desk at the main hospital entrance.     -  Stop at the security desk and they will direct surgery patients to Registration and the Surgery Check in and Family Lounge. [phone]        - If you need directions, a wheelchair or an escort please stop at the Information/security desk in the lobby.     What can I eat or drink?  -  You may eat and drink normally up to 8 hours prior to arrival time. (Until 11:45 pm 24)  -  You may have clear liquids until 2 hours prior to arrival time. (Until 5:45 am)    Examples of clear liquids:  Water  Clear broth  Juices (apple, white grape, white cranberry  and cider) without pulp  Noncarbonated, powder based beverages  (lemonade and Misbah-Aid)  Sodas (Sprite, 7-Up, ginger ale and seltzer)  Coffee or tea (without milk or cream)  Gatorade    -  No Alcohol or cannabis products for at least 24 hours before surgery.     Which medicines can I take?  Hold Aspirin for 7 days before surgery.   Hold Multivitamins for 7 days before surgery.  Hold Supplements for 7 days before surgery.  Hold Ibuprofen (Advil, Motrin) for 1 day before surgery--unless otherwise  directed by surgeon.  Hold Naproxen (Aleve) for 4 days before surgery.  Acetaminophen (Tylenol) is okay to take if needed.    -  DO NOT take these medications the day of surgery:  Metformin (Glucophage)    -  PLEASE TAKE these medications the day of surgery:  Acetaminophen (Tylenol) if needed    How do I prepare myself?  - Please take 2 showers (one the night prior to surgery and one the morning of surgery) using Scrubcare or Hibiclens soap.    Use this soap only from the neck to your toes.     Leave the soap on your skin for one minute--then rinse thoroughly.      You may use your own shampoo and conditioner. No other hair products.   - Please remove all jewelry and body piercings.  - No lotions, deodorants or fragrance.  - No makeup or fingernail polish.   - Bring your ID and insurance card.    -If you use a CPAP machine, please bring the CPAP machine, tubing, and mask to hospital.    -If you have a Deep Brain Stimulator, Spinal Cord Stimulator, or any Neuro Stimulator device---you must bring the remote control to the hospital.      ALL PATIENTS GOING HOME THE SAME DAY OF SURGERY ARE REQUIRED TO HAVE A RESPONSIBLE ADULT TO DRIVE AND BE IN ATTENDANCE WITH THEM FOR 24 HOURS FOLLOWING SURGERY.    Covid testing policy as of 12/06/2022  Your surgeon will notify and schedule you for a COVID test if one is needed before surgery--please direct any questions or COVID symptoms to your surgeon      Questions or Concerns:    - For any questions regarding the day of surgery or your hospital stay, please contact the Pre Admission Nursing Office at [phone].       - If you have health changes between today and your surgery, please call your surgeon.       - For questions after surgery, please call your surgeons office.           Current Visitor Guidelines    You may have 2 visitors in the pre op area.    Visiting hours: 8 a.m. to 8:30 p.m.    Patients confirmed or suspected to have symptoms of COVID 19 or flu:     No  visitors allowed for adult patients.   Children (under age 18) can have 1 named visitor.     People who are sick or showing symptoms of COVID 19 or flu:    Are not allowed to visit patients--we can only make exceptions in special situations.       Please follow these guidelines for your visit:          Please maintain social distance          Masking is optional--however at times you may be asked to wear a mask for the safety of yourself and others     Clean your hands with alcohol hand . Do this when you arrive at and leave the building and patient room,    And again after you touch your mask or anything in the room.     Go directly to and from the room you are visiting.     Stay in the patient s room during your visit. Limit going to other places in the hospital as much as possible     Leave bags and jackets at home or in the car.     For everyone s health, please don t come and go during your visit. That includes for smoking   during your visit.

## 2024-07-11 NOTE — TELEPHONE ENCOUNTER
FUTURE VISIT INFORMATION      SURGERY INFORMATION:  Date: 24  Location: [facility]  Surgeon:  Terell Michael MD   Anesthesia Type:  MAC  Procedure: EUS/ Upper GI Tract  Consult: 24 - ED visit with Camille Posey MD     RECORDS REQUESTED FROM:       Primary Care Provider: None    Most recent EKG+ Tracin11 ECG

## 2024-07-19 ENCOUNTER — ANESTHESIA (OUTPATIENT)
Dept: SURGERY | Facility: CLINIC | Age: 64
End: 2024-07-19
Payer: COMMERCIAL

## 2024-07-19 ENCOUNTER — HOSPITAL ENCOUNTER (OUTPATIENT)
Facility: CLINIC | Age: 64
Discharge: HOME | End: 2024-07-19
Attending: INTERNAL MEDICINE | Admitting: INTERNAL MEDICINE
Payer: COMMERCIAL

## 2024-07-19 VITALS
RESPIRATION RATE: 16 BRPM | DIASTOLIC BLOOD PRESSURE: 71 MMHG | WEIGHT: 270.28 LBS | BODY MASS INDEX: 40.03 KG/M2 | SYSTOLIC BLOOD PRESSURE: 150 MMHG | HEIGHT: 69 IN | HEART RATE: 52 BPM | OXYGEN SATURATION: 100 % | TEMPERATURE: 97.6 F

## 2024-07-19 LAB
GLUCOSE BLDC GLUCOMTR-MCNC: 125 MG/DL (ref 70–99)
GLUCOSE BLDC GLUCOMTR-MCNC: 148 MG/DL (ref 70–99)

## 2024-07-19 PROCEDURE — 88173 CYTOPATH EVAL FNA REPORT: CPT | Mod: 26 | Performed by: PATHOLOGY

## 2024-07-19 PROCEDURE — 370N000017 HC ANESTHESIA TECHNICAL FEE, PER MIN: Performed by: INTERNAL MEDICINE

## 2024-07-19 PROCEDURE — 360N000075 HC SURGERY LEVEL 2, PER MIN: Performed by: INTERNAL MEDICINE

## 2024-07-19 PROCEDURE — 999N000141 HC STATISTIC PRE-PROCEDURE NURSING ASSESSMENT: Performed by: INTERNAL MEDICINE

## 2024-07-19 PROCEDURE — 88172 CYTP DX EVAL FNA 1ST EA SITE: CPT | Mod: 26 | Performed by: PATHOLOGY

## 2024-07-19 PROCEDURE — 258N000003 HC RX IP 258 OP 636: Performed by: NURSE ANESTHETIST, CERTIFIED REGISTERED

## 2024-07-19 PROCEDURE — 82962 GLUCOSE BLOOD TEST: CPT

## 2024-07-19 PROCEDURE — 43242 EGD US FINE NEEDLE BX/ASPIR: CPT | Performed by: NURSE ANESTHETIST, CERTIFIED REGISTERED

## 2024-07-19 PROCEDURE — 88305 TISSUE EXAM BY PATHOLOGIST: CPT | Mod: 26 | Performed by: PATHOLOGY

## 2024-07-19 PROCEDURE — 88342 IMHCHEM/IMCYTCHM 1ST ANTB: CPT | Mod: TC | Performed by: INTERNAL MEDICINE

## 2024-07-19 PROCEDURE — 250N000011 HC RX IP 250 OP 636: Performed by: NURSE ANESTHETIST, CERTIFIED REGISTERED

## 2024-07-19 PROCEDURE — 250N000009 HC RX 250: Performed by: NURSE ANESTHETIST, CERTIFIED REGISTERED

## 2024-07-19 PROCEDURE — 272N000001 HC OR GENERAL SUPPLY STERILE: Performed by: INTERNAL MEDICINE

## 2024-07-19 PROCEDURE — 43242 EGD US FINE NEEDLE BX/ASPIR: CPT | Performed by: ANESTHESIOLOGY

## 2024-07-19 PROCEDURE — 710N000012 HC RECOVERY PHASE 2, PER MINUTE: Performed by: INTERNAL MEDICINE

## 2024-07-19 PROCEDURE — 88360 TUMOR IMMUNOHISTOCHEM/MANUAL: CPT | Mod: 26 | Performed by: PATHOLOGY

## 2024-07-19 PROCEDURE — 88342 IMHCHEM/IMCYTCHM 1ST ANTB: CPT | Mod: 26 | Performed by: PATHOLOGY

## 2024-07-19 PROCEDURE — 88341 IMHCHEM/IMCYTCHM EA ADD ANTB: CPT | Mod: 26 | Performed by: PATHOLOGY

## 2024-07-19 RX ORDER — LIDOCAINE 40 MG/G
CREAM TOPICAL
Status: DISCONTINUED | OUTPATIENT
Start: 2024-07-19 | End: 2024-07-19 | Stop reason: HOSPADM

## 2024-07-19 RX ORDER — ONDANSETRON 2 MG/ML
4 INJECTION INTRAMUSCULAR; INTRAVENOUS EVERY 30 MIN PRN
Status: DISCONTINUED | OUTPATIENT
Start: 2024-07-19 | End: 2024-07-19 | Stop reason: HOSPADM

## 2024-07-19 RX ORDER — FENTANYL CITRATE 50 UG/ML
50 INJECTION, SOLUTION INTRAMUSCULAR; INTRAVENOUS EVERY 5 MIN PRN
Status: DISCONTINUED | OUTPATIENT
Start: 2024-07-19 | End: 2024-07-19 | Stop reason: HOSPADM

## 2024-07-19 RX ORDER — PROPOFOL 10 MG/ML
INJECTION, EMULSION INTRAVENOUS CONTINUOUS PRN
Status: DISCONTINUED | OUTPATIENT
Start: 2024-07-19 | End: 2024-07-19

## 2024-07-19 RX ORDER — PROPOFOL 10 MG/ML
INJECTION, EMULSION INTRAVENOUS PRN
Status: DISCONTINUED | OUTPATIENT
Start: 2024-07-19 | End: 2024-07-19

## 2024-07-19 RX ORDER — FLUMAZENIL 0.1 MG/ML
0.2 INJECTION, SOLUTION INTRAVENOUS
Status: DISCONTINUED | OUTPATIENT
Start: 2024-07-19 | End: 2024-07-19 | Stop reason: HOSPADM

## 2024-07-19 RX ORDER — FENTANYL CITRATE 50 UG/ML
INJECTION, SOLUTION INTRAMUSCULAR; INTRAVENOUS PRN
Status: DISCONTINUED | OUTPATIENT
Start: 2024-07-19 | End: 2024-07-19

## 2024-07-19 RX ORDER — LIDOCAINE HYDROCHLORIDE 20 MG/ML
INJECTION, SOLUTION INFILTRATION; PERINEURAL PRN
Status: DISCONTINUED | OUTPATIENT
Start: 2024-07-19 | End: 2024-07-19

## 2024-07-19 RX ORDER — ONDANSETRON 4 MG/1
4 TABLET, ORALLY DISINTEGRATING ORAL EVERY 30 MIN PRN
Status: DISCONTINUED | OUTPATIENT
Start: 2024-07-19 | End: 2024-07-19 | Stop reason: HOSPADM

## 2024-07-19 RX ORDER — NALOXONE HYDROCHLORIDE 0.4 MG/ML
0.4 INJECTION, SOLUTION INTRAMUSCULAR; INTRAVENOUS; SUBCUTANEOUS
Status: DISCONTINUED | OUTPATIENT
Start: 2024-07-19 | End: 2024-07-19 | Stop reason: HOSPADM

## 2024-07-19 RX ORDER — SODIUM CHLORIDE, SODIUM LACTATE, POTASSIUM CHLORIDE, CALCIUM CHLORIDE 600; 310; 30; 20 MG/100ML; MG/100ML; MG/100ML; MG/100ML
INJECTION, SOLUTION INTRAVENOUS CONTINUOUS
Status: DISCONTINUED | OUTPATIENT
Start: 2024-07-19 | End: 2024-07-19 | Stop reason: HOSPADM

## 2024-07-19 RX ORDER — GLYCOPYRROLATE 0.2 MG/ML
INJECTION, SOLUTION INTRAMUSCULAR; INTRAVENOUS PRN
Status: DISCONTINUED | OUTPATIENT
Start: 2024-07-19 | End: 2024-07-19

## 2024-07-19 RX ORDER — HYDRALAZINE HYDROCHLORIDE 20 MG/ML
2.5-5 INJECTION INTRAMUSCULAR; INTRAVENOUS EVERY 10 MIN PRN
Status: DISCONTINUED | OUTPATIENT
Start: 2024-07-19 | End: 2024-07-19 | Stop reason: HOSPADM

## 2024-07-19 RX ORDER — ONDANSETRON 2 MG/ML
4 INJECTION INTRAMUSCULAR; INTRAVENOUS EVERY 6 HOURS PRN
Status: DISCONTINUED | OUTPATIENT
Start: 2024-07-19 | End: 2024-07-19 | Stop reason: HOSPADM

## 2024-07-19 RX ORDER — DEXAMETHASONE SODIUM PHOSPHATE 4 MG/ML
4 INJECTION, SOLUTION INTRA-ARTICULAR; INTRALESIONAL; INTRAMUSCULAR; INTRAVENOUS; SOFT TISSUE
Status: DISCONTINUED | OUTPATIENT
Start: 2024-07-19 | End: 2024-07-19 | Stop reason: HOSPADM

## 2024-07-19 RX ORDER — SODIUM CHLORIDE, SODIUM LACTATE, POTASSIUM CHLORIDE, CALCIUM CHLORIDE 600; 310; 30; 20 MG/100ML; MG/100ML; MG/100ML; MG/100ML
INJECTION, SOLUTION INTRAVENOUS CONTINUOUS PRN
Status: DISCONTINUED | OUTPATIENT
Start: 2024-07-19 | End: 2024-07-19

## 2024-07-19 RX ORDER — MEPERIDINE HYDROCHLORIDE 25 MG/ML
12.5 INJECTION INTRAMUSCULAR; INTRAVENOUS; SUBCUTANEOUS EVERY 5 MIN PRN
Status: DISCONTINUED | OUTPATIENT
Start: 2024-07-19 | End: 2024-07-19 | Stop reason: HOSPADM

## 2024-07-19 RX ORDER — FLUMAZENIL 0.1 MG/ML
0.2 INJECTION, SOLUTION INTRAVENOUS
Status: CANCELLED | OUTPATIENT
Start: 2024-07-19 | End: 2024-07-19

## 2024-07-19 RX ORDER — NALOXONE HYDROCHLORIDE 0.4 MG/ML
0.2 INJECTION, SOLUTION INTRAMUSCULAR; INTRAVENOUS; SUBCUTANEOUS
Status: DISCONTINUED | OUTPATIENT
Start: 2024-07-19 | End: 2024-07-19 | Stop reason: HOSPADM

## 2024-07-19 RX ORDER — ONDANSETRON 4 MG/1
4 TABLET, ORALLY DISINTEGRATING ORAL EVERY 6 HOURS PRN
Status: DISCONTINUED | OUTPATIENT
Start: 2024-07-19 | End: 2024-07-19 | Stop reason: HOSPADM

## 2024-07-19 RX ORDER — OXYCODONE HYDROCHLORIDE 5 MG/1
5 TABLET ORAL
Status: DISCONTINUED | OUTPATIENT
Start: 2024-07-19 | End: 2024-07-19 | Stop reason: HOSPADM

## 2024-07-19 RX ORDER — NALOXONE HYDROCHLORIDE 0.4 MG/ML
0.1 INJECTION, SOLUTION INTRAMUSCULAR; INTRAVENOUS; SUBCUTANEOUS
Status: DISCONTINUED | OUTPATIENT
Start: 2024-07-19 | End: 2024-07-19 | Stop reason: HOSPADM

## 2024-07-19 RX ORDER — FENTANYL CITRATE 50 UG/ML
25 INJECTION, SOLUTION INTRAMUSCULAR; INTRAVENOUS
Status: DISCONTINUED | OUTPATIENT
Start: 2024-07-19 | End: 2024-07-19 | Stop reason: HOSPADM

## 2024-07-19 RX ORDER — ONDANSETRON 2 MG/ML
INJECTION INTRAMUSCULAR; INTRAVENOUS PRN
Status: DISCONTINUED | OUTPATIENT
Start: 2024-07-19 | End: 2024-07-19

## 2024-07-19 RX ORDER — LABETALOL HYDROCHLORIDE 5 MG/ML
10 INJECTION, SOLUTION INTRAVENOUS
Status: DISCONTINUED | OUTPATIENT
Start: 2024-07-19 | End: 2024-07-19 | Stop reason: HOSPADM

## 2024-07-19 RX ORDER — FENTANYL CITRATE 50 UG/ML
25 INJECTION, SOLUTION INTRAMUSCULAR; INTRAVENOUS EVERY 5 MIN PRN
Status: DISCONTINUED | OUTPATIENT
Start: 2024-07-19 | End: 2024-07-19 | Stop reason: HOSPADM

## 2024-07-19 RX ORDER — ONDANSETRON 4 MG/1
4 TABLET, ORALLY DISINTEGRATING ORAL EVERY 6 HOURS PRN
Status: CANCELLED | OUTPATIENT
Start: 2024-07-19

## 2024-07-19 RX ORDER — HYDROMORPHONE HCL IN WATER/PF 6 MG/30 ML
0.4 PATIENT CONTROLLED ANALGESIA SYRINGE INTRAVENOUS EVERY 5 MIN PRN
Status: DISCONTINUED | OUTPATIENT
Start: 2024-07-19 | End: 2024-07-19 | Stop reason: HOSPADM

## 2024-07-19 RX ORDER — ONDANSETRON 2 MG/ML
4 INJECTION INTRAMUSCULAR; INTRAVENOUS EVERY 6 HOURS PRN
Status: CANCELLED | OUTPATIENT
Start: 2024-07-19

## 2024-07-19 RX ORDER — HYDROMORPHONE HCL IN WATER/PF 6 MG/30 ML
0.2 PATIENT CONTROLLED ANALGESIA SYRINGE INTRAVENOUS EVERY 5 MIN PRN
Status: DISCONTINUED | OUTPATIENT
Start: 2024-07-19 | End: 2024-07-19 | Stop reason: HOSPADM

## 2024-07-19 RX ADMIN — GLYCOPYRROLATE 0.2 MG: 0.2 INJECTION, SOLUTION INTRAMUSCULAR; INTRAVENOUS at 11:00

## 2024-07-19 RX ADMIN — TOPICAL ANESTHETIC 1 EACH: 200 SPRAY DENTAL; PERIODONTAL at 10:30

## 2024-07-19 RX ADMIN — PROPOFOL 30 MG: 10 INJECTION, EMULSION INTRAVENOUS at 10:41

## 2024-07-19 RX ADMIN — FENTANYL CITRATE 25 MCG: 50 INJECTION INTRAMUSCULAR; INTRAVENOUS at 11:19

## 2024-07-19 RX ADMIN — PROPOFOL 20 MG: 10 INJECTION, EMULSION INTRAVENOUS at 10:57

## 2024-07-19 RX ADMIN — FENTANYL CITRATE 25 MCG: 50 INJECTION INTRAMUSCULAR; INTRAVENOUS at 11:05

## 2024-07-19 RX ADMIN — LIDOCAINE HYDROCHLORIDE 40 MG: 20 INJECTION, SOLUTION INFILTRATION; PERINEURAL at 10:31

## 2024-07-19 RX ADMIN — PROPOFOL 20 MG: 10 INJECTION, EMULSION INTRAVENOUS at 10:48

## 2024-07-19 RX ADMIN — SODIUM CHLORIDE, POTASSIUM CHLORIDE, SODIUM LACTATE AND CALCIUM CHLORIDE: 600; 310; 30; 20 INJECTION, SOLUTION INTRAVENOUS at 10:28

## 2024-07-19 RX ADMIN — ONDANSETRON 4 MG: 2 INJECTION INTRAMUSCULAR; INTRAVENOUS at 10:35

## 2024-07-19 RX ADMIN — PROPOFOL 150 MCG/KG/MIN: 10 INJECTION, EMULSION INTRAVENOUS at 10:31

## 2024-07-19 ASSESSMENT — ACTIVITIES OF DAILY LIVING (ADL)
ADLS_ACUITY_SCORE: 31
ADLS_ACUITY_SCORE: 31
ADLS_ACUITY_SCORE: 34
ADLS_ACUITY_SCORE: 34
ADLS_ACUITY_SCORE: 31
ADLS_ACUITY_SCORE: 31

## 2024-07-19 NOTE — OR NURSING
1248: MD Dr. Michael paged; Please come see pt, has met discharge criteria, is ready to head home. Thanks.

## 2024-07-19 NOTE — ANESTHESIA PREPROCEDURE EVALUATION
Anesthesia Pre-Procedure Evaluation    Patient: Ingrid Oakes   MRN: [id number] : 1960        Procedure : Procedure(s):  ENDOSCOPIC ULTRASOUND, ESOPHAGOSCOPY / UPPER GASTROINTESTINAL TRACT (GI)          Past Medical History:   Diagnosis Date    Abnormal finding on imaging     Hypothyroidism     Irritable bowel syndrome     Mild recurrent major depression (H24)     Nephrolithiasis     RENE (obstructive sleep apnea)     Type 2 diabetes mellitus without complication (H)       Past Surgical History:   Procedure Laterality Date    SURGICAL HISTORY OF -       dermoid tumor bilateral ovaries    SURGICAL HISTORY OF -       c/section      Allergies   Allergen Reactions    Sulfa Antibiotics      Mother and brother allergic, she has not tried it      Social History     Tobacco Use    Smoking status: Never    Smokeless tobacco: Not on file   Substance Use Topics    Alcohol use: Yes     Alcohol/week: 1.0 - 2.0 standard drink of alcohol     Types: 1 Standard drinks or equivalent per week     Comment: per month      Wt Readings from Last 1 Encounters:   24 122.6 kg (270 lb 4.5 oz)        Anesthesia Evaluation   Pt has had prior anesthetic.     No history of anesthetic complications       ROS/MED HX  ENT/Pulmonary:     (+) sleep apnea (uses dental appliance),                                       Neurologic:  - neg neurologic ROS     Cardiovascular:     (+) Dyslipidemia - -   -  - -                                      METS/Exercise Tolerance: 3 - Able to walk 1-2 blocks without stopping    Hematologic:       Musculoskeletal:       GI/Hepatic: Comment: Pancreatic tail mass  hepatomegaly    (+) GERD (from metformin),                   Renal/Genitourinary:     (+) renal disease, type: CRI,     Nephrolithiasis ,       Endo:     (+)  type II DM,   Not using insulin,     thyroid problem, hypothyroidism,           Psychiatric/Substance Use:       Infectious Disease:       Malignancy:       Other:            Physical  "Exam    Airway        Mallampati: III   TM distance: > 3 FB   Neck ROM: full   Mouth opening: > 3 cm    Respiratory Devices and Support         Dental     Comment: Patient reports no loose or chipped teeth        Cardiovascular          Rhythm and rate: regular and normal     Pulmonary           breath sounds clear to auscultation           OUTSIDE LABS:  CBC:   Lab Results   Component Value Date    WBC 12.2 (H) 07/07/2024    HGB 14.9 07/07/2024    HCT 44.4 07/07/2024     07/07/2024     BMP:   Lab Results   Component Value Date     07/07/2024    POTASSIUM 4.2 07/07/2024    CHLORIDE 104 07/07/2024    CO2 22 07/07/2024    BUN 14.8 07/07/2024    CR 1.07 (H) 07/07/2024     (H) 07/07/2024     COAGS: No results found for: \"PTT\", \"INR\", \"FIBR\"  POC: No results found for: \"BGM\", \"HCG\", \"HCGS\"  HEPATIC:   Lab Results   Component Value Date    ALBUMIN 4.2 07/07/2024    PROTTOTAL 7.3 07/07/2024    ALT 18 07/07/2024    AST 17 07/07/2024    ALKPHOS 76 07/07/2024    BILITOTAL 0.5 07/07/2024     OTHER:   Lab Results   Component Value Date    A1C 8.7 (H) 07/07/2024    COOPER 9.3 07/07/2024    LIPASE 32 07/07/2024       Anesthesia Plan    ASA Status:  2    NPO Status:  NPO Appropriate    Anesthesia Type: MAC.              Consents    Anesthesia Plan(s) and associated risks, benefits, and realistic alternatives discussed. Questions answered and patient/representative(s) expressed understanding.     - Discussed:     - Discussed with:  Patient      - Extended Intubation/Ventilatory Support Discussed: No.      - Patient is DNR/DNI Status: No     Use of blood products discussed: No .     Postoperative Care            Comments:    Other Comments: Discussed possible recall during procedure/anesthetic    Discussed possible GA           David Cruz MD    I have reviewed the pertinent notes and labs in the chart from the past 30 days and (re)examined the patient.  Any updates or changes from those notes are reflected in this " "note.              # DMII: A1C = 8.7 % (Ref range: <5.7 %) within past 6 months  # Obesity: Estimated body mass index is 39.91 kg/m  as calculated from the following:    Height as of this encounter: 1.753 m (5' 9\").    Weight as of this encounter: 122.6 kg (270 lb 4.5 oz).      "

## 2024-07-19 NOTE — ANESTHESIA CARE TRANSFER NOTE
Patient: Ingrid Oaeks    Procedure: Procedure(s):  ENDOSCOPIC ULTRASOUND, ESOPHAGOSCOPY / UPPER GASTROINTESTINAL TRACT (GI), Fine need aspiration       Diagnosis: Abnormal finding on imaging [R93.89]  Diagnosis Additional Information: No value filed.    Anesthesia Type:   MAC     Note:    Oropharynx: oropharynx clear of all foreign objects and spontaneously breathing  Level of Consciousness: awake  Oxygen Supplementation: room air    Independent Airway: airway patency satisfactory and stable  Dentition: dentition unchanged  Vital Signs Stable: post-procedure vital signs reviewed and stable  Report to RN Given: handoff report given  Patient transferred to: Phase II    Handoff Report: Identifed the Patient, Identified the Reponsible Provider, Reviewed the pertinent medical history, Discussed the surgical course, Reviewed Intra-OP anesthesia mangement and issues during anesthesia, Set expectations for post-procedure period and Allowed opportunity for questions and acknowledgement of understanding    Vitals:  Vitals Value Taken Time   BP     Temp     Pulse     Resp     SpO2         Electronically Signed By: TYRONE Guadalupe CRNA  July 19, 2024  11:43 AM

## 2024-07-19 NOTE — BRIEF OP NOTE
[facility]    Brief Operative Note    Pre-operative diagnosis: Abnormal finding on imaging [R93.89]  Post-operative diagnosis Same as pre-operative diagnosis    Procedure: ENDOSCOPIC ULTRASOUND, ESOPHAGOSCOPY / UPPER GASTROINTESTINAL TRACT (GI), Fine need aspiration, N/A - Esophagus    Surgeon: Surgeons and Role:     * Terell Michael MD - Primary  Anesthesia: MAC   Estimated Blood Loss: Minimal    Drains: None  Specimens:   ID Type Source Tests Collected by Time Destination   1 : pancreatic tail mass Fine Needle Aspiration Pancreas FINE NEEDLE ASPIRATE Terell Michael MD 7/19/2024 11:21 AM      Findings:   Normal EGD.  A 15 mm round, discrete hypoechoic mass was seen in the pancreatic tail. Needle aspiration x 4 was performed using a 25 ga EchoTip needle. Preliminary cytology showed adequate cellularity.    Complications: None.  Implants: * No implants in log *    RAINE Michael MD  Professor of Medicine  Division of Gastroenterology, Hepatology and Nutrition  [facility]

## 2024-07-19 NOTE — ANESTHESIA POSTPROCEDURE EVALUATION
Patient: Ingrid Oakes    Procedure: Procedure(s):  ENDOSCOPIC ULTRASOUND, ESOPHAGOSCOPY / UPPER GASTROINTESTINAL TRACT (GI), Fine need aspiration       Anesthesia Type:  MAC    Note:  Disposition: Outpatient   Postop Pain Control: Uneventful            Sign Out: Well controlled pain   PONV: No   Neuro/Psych: Uneventful            Sign Out: Acceptable/Baseline neuro status   Airway/Respiratory: Uneventful            Sign Out: Acceptable/Baseline resp. status   CV/Hemodynamics: Uneventful            Sign Out: Acceptable CV status; No obvious hypovolemia; No obvious fluid overload   Other NRE: NONE   DID A NON-ROUTINE EVENT OCCUR? No           Last vitals:  Vitals Value Taken Time   /72 07/19/24 1245   Temp 36.4  C (97.6  F) 07/19/24 1215   Pulse 56 07/19/24 1215   Resp 16 07/19/24 1215   SpO2 100 % 07/19/24 1215       Electronically Signed By: David Cruz MD  July 19, 2024  12:51 PM

## 2024-07-23 LAB
PATH REPORT.COMMENTS IMP SPEC: ABNORMAL
PATH REPORT.COMMENTS IMP SPEC: YES
PATH REPORT.FINAL DX SPEC: ABNORMAL
PATH REPORT.GROSS SPEC: ABNORMAL
PATH REPORT.MICROSCOPIC SPEC OTHER STN: ABNORMAL
PATH REPORT.RELEVANT HX SPEC: ABNORMAL

## 2024-07-23 NOTE — TELEPHONE ENCOUNTER
RECORDS STATUS - ALL OTHER DIAGNOSIS      RECORDS RECEIVED FROM: [facility]   NOTES STATUS DETAILS   OFFICE NOTE from referring provider Epic 7/7/24: Dr Camille Posey   DISCHARGE REPORT from the ER [facility] 7/7/24: [facility] ED   OPERATIVE REPORT [facility] 7/19/24: EUS   MEDICATION LIST [facility]    LABS     PATHOLOGY REPORTS Report in [facility] 7/19/24: [id number]   ANYTHING RELATED TO DIAGNOSIS Epic Most recent 7/19/24   IMAGING (NEED IMAGES & REPORT)     CT SCANS PACS 7/7/24: CT Abd Pel

## 2024-07-24 ENCOUNTER — ONCOLOGY VISIT (OUTPATIENT)
Dept: ONCOLOGY | Facility: HOSPITAL | Age: 64
End: 2024-07-24
Attending: EMERGENCY MEDICINE
Payer: COMMERCIAL

## 2024-07-24 ENCOUNTER — LAB (OUTPATIENT)
Dept: INFUSION THERAPY | Facility: HOSPITAL | Age: 64
End: 2024-07-24
Attending: EMERGENCY MEDICINE
Payer: COMMERCIAL

## 2024-07-24 ENCOUNTER — PRE VISIT (OUTPATIENT)
Dept: ONCOLOGY | Facility: HOSPITAL | Age: 64
End: 2024-07-24
Payer: COMMERCIAL

## 2024-07-24 ENCOUNTER — PATIENT OUTREACH (OUTPATIENT)
Dept: ONCOLOGY | Facility: HOSPITAL | Age: 64
End: 2024-07-24
Payer: COMMERCIAL

## 2024-07-24 VITALS
HEART RATE: 88 BPM | TEMPERATURE: 99 F | WEIGHT: 267 LBS | HEIGHT: 68 IN | OXYGEN SATURATION: 98 % | SYSTOLIC BLOOD PRESSURE: 136 MMHG | BODY MASS INDEX: 40.47 KG/M2 | DIASTOLIC BLOOD PRESSURE: 75 MMHG | RESPIRATION RATE: 18 BRPM

## 2024-07-24 DIAGNOSIS — K86.89 PANCREATIC MASS: ICD-10-CM

## 2024-07-24 DIAGNOSIS — D3A.8 NEUROENDOCRINE TUMOR OF PANCREAS (H): ICD-10-CM

## 2024-07-24 DIAGNOSIS — D3A.8 NEUROENDOCRINE TUMOR OF PANCREAS (H): Primary | ICD-10-CM

## 2024-07-24 PROCEDURE — 99204 OFFICE O/P NEW MOD 45 MIN: CPT | Performed by: INTERNAL MEDICINE

## 2024-07-24 PROCEDURE — 86301 IMMUNOASSAY TUMOR CA 19-9: CPT

## 2024-07-24 PROCEDURE — 36415 COLL VENOUS BLD VENIPUNCTURE: CPT

## 2024-07-24 PROCEDURE — 86316 IMMUNOASSAY TUMOR OTHER: CPT | Mod: XU

## 2024-07-24 PROCEDURE — G2211 COMPLEX E/M VISIT ADD ON: HCPCS | Performed by: INTERNAL MEDICINE

## 2024-07-24 PROCEDURE — 99213 OFFICE O/P EST LOW 20 MIN: CPT | Performed by: INTERNAL MEDICINE

## 2024-07-24 PROCEDURE — 82378 CARCINOEMBRYONIC ANTIGEN: CPT

## 2024-07-24 RX ORDER — ACETAMINOPHEN 500 MG
1000 TABLET ORAL PRN
COMMUNITY

## 2024-07-24 RX ORDER — CALCIUM CARBONATE 500 MG/1
1 TABLET, CHEWABLE ORAL DAILY PRN
COMMUNITY
End: 2024-08-07

## 2024-07-24 RX ORDER — IBUPROFEN 200 MG
200-400 TABLET ORAL PRN
COMMUNITY

## 2024-07-24 ASSESSMENT — PAIN SCALES - GENERAL: PAINLEVEL: NO PAIN (0)

## 2024-07-24 NOTE — LETTER
"7/24/2024      Ingrid Oakes  [address] [place] [address]      Dear Colleague,    Thank you for referring your patient, Ingrid Oakes, to the [facility]. Please see a copy of my visit note below.    Oncology Rooming Note    July 24, 2024 12:56 PM   Ingrid Oakes is a 64 year old female who presents for:    Chief Complaint   Patient presents with     Oncology Clinic Visit     New patient consult     Initial Vitals: /75 (BP Location: Right arm, Patient Position: Sitting, Cuff Size: Adult Large)   Pulse 88   Temp 99  F (37.2  C) (Tympanic)   Resp 18   Ht 1.734 m (5' 8.25\")   Wt 121.1 kg (267 lb)   LMP  (LMP Unknown)   SpO2 98%   BMI 40.30 kg/m   Estimated body mass index is 40.3 kg/m  as calculated from the following:    Height as of this encounter: 1.734 m (5' 8.25\").    Weight as of this encounter: 121.1 kg (267 lb). Body surface area is 2.41 meters squared.  No Pain (0) Comment: Data Unavailable   No LMP recorded (lmp unknown). Patient is postmenopausal.  Allergies reviewed: Yes  Medications reviewed: Yes    Medications: Medication refills not needed today.  Pharmacy name entered into [facility]:    [id number] - [place], [place] - [address]  [id number] - [place], [place] - [address]    Frailty Screening:   Is the patient here for a new oncology consult visit in cancer care? 1. Yes. Over the past month, have you experienced difficulty or required a caregiver to assist with:   1. Balance, walking or general mobility (including any falls)? NO  2. Completion of self-care tasks such as bathing, dressing, toileting, grooming/hygiene?  NO  3. Concentration or memory that affects your daily life?  NO       Clinical concerns: N/A       MANUELA BATES CMA              [facility] Hematology and Oncology Consult Note    Patient: Ingrid Oakes  MRN: " [id number]  Date of Service: Jul 24, 2024           Reason for consultation      Problem List Items Addressed This Visit          Digestive    Neuroendocrine tumor of pancreas (H28) - Primary    Relevant Orders    Cancer antigen 19-9    CEA    Chromogranin A    Adult Oncology/Hematology  Referral     Other Visit Diagnoses       Pancreatic mass        Relevant Orders    Cancer antigen 19-9    CEA    Chromogranin A    Adult Oncology/Hematology  Referral              Assessment / number of problems addressed      1.  Pancreatic neuroendocrine tumor  2.  Newly diagnosed diabetes  3.  Slightly overweight    Plan and medical decision making      Patient presenting with newly diagnosed pancreatic malignancy.Reviewed notes from each unique source.  Reviewed each unique test.    Ordered tests.    Independently interpreted lab tests and radiological exams performed by other physicians.  Personally reviewed the images of the CT scan.  Independent historian account obtained as noted from her .    Discussed with the patient and her  that this is a low-grade tumor that can easily be watched.  Surgical resection is another option.  We will make a referral to see Dr. Nelson from hepatobiliary surgery.  Advised to get her blood sugars and diabetes under better control.  Improve her lifestyle etc.  Follow-up with me after she has seen Dr. Nelson.  Get some baseline labs including tumor markers.    Clinical/pathological stage      Cancer Staging   No matching staging information was found for the patient.      History of present illness      Ms. Ingrid Oakes is a 64 year old woman who was diagnosed with 15 mm sized pancreatic neuroendocrine tumor.  This presented incidentally when she had a CT scan done looking for kidney stone in June 2024.  That showed a lesion in her distal pancreas.  She was then referred to Dr. Michael at [facility].  Dr. Michael did EUS and biopsy which confirmed  low-grade neuroendocrine tumor.    The patient has been recently diagnosed with diabetes.  Her hemoglobin A1c was over 9.  Her blood sugars have been running high.    Comes in today accompanied by her .    Detailed review of systems      A 14 point review of systems was obtained.  Positive findings noted in the history.  Rest of the review of system is otherwise negative.      Past medical/surgical/social/family history        Past Medical History:   Diagnosis Date     Abnormal finding on imaging      Hypothyroidism      Irritable bowel syndrome      Mild recurrent major depression (H24)      Nephrolithiasis      RENE (obstructive sleep apnea)      Type 2 diabetes mellitus without complication (H)      Past Surgical History:   Procedure Laterality Date     ENDOSCOPIC ULTRASOUND UPPER GASTROINTESTINAL TRACT (GI) N/A 7/19/2024    Procedure: ENDOSCOPIC ULTRASOUND, ESOPHAGOSCOPY / UPPER GASTROINTESTINAL TRACT (GI), Fine needle aspiration;  Surgeon: Terell Michael MD;  Location: [facility] OR     SURGICAL HISTORY OF -   1988    dermoid tumor bilateral ovaries     SURGICAL HISTORY OF -       c/section     Family History   Problem Relation Age of Onset     Anesthesia Reaction Mother         Difficulty waking. Was paranoid     Lymphoma Mother      Thyroid Disease Mother      Hypertension Father      Diabetes Brother      Hypertension Brother      Depression Brother      Osteoporosis Paternal Grandmother      Diabetes Maternal Aunt      Lymphoma Maternal Aunt      Thyroid Cancer Cousin      Colon Cancer Maternal Uncle      Breast Cancer Paternal Aunt      Bleeding Disorder No family hx of      Clotting Disorder No family hx of      Social History     Socioeconomic History     Marital status:      Spouse name: None     Number of children: None     Years of education: None     Highest education level: None   Tobacco Use     Smoking status: Never     Passive exposure: Never   Vaping Use     Vaping status: Never Used  "  Substance and Sexual Activity     Alcohol use: Yes     Alcohol/week: 1.0 - 2.0 standard drink of alcohol     Types: 1 Standard drinks or equivalent per week     Comment: per month     Drug use: No     Sexual activity: Yes     Partners: Male           Allergies      Allergies   Allergen Reactions     Sulfa Antibiotics      Mother and brother allergic, she has not tried it         Physical exam        /75 (BP Location: Right arm, Patient Position: Sitting, Cuff Size: Adult Large)   Pulse 88   Temp 99  F (37.2  C) (Tympanic)   Resp 18   Ht 1.734 m (5' 8.25\")   Wt 121.1 kg (267 lb)   LMP  (LMP Unknown)   SpO2 98%   BMI 40.30 kg/m        GENERAL: Alert and oriented to time place and person. Seated comfortably. In no distress.    HEAD: Atraumatic and normocephalic.    EYES: ELINA, EOMI. No pallor. No icterus.    Oral cavity: no mucosal lesion or tonsillar enlargement.    NECK: supple. JVP normal.No thyroid enlargement.    LYMPH NODES: No palpable, cervical, axillary or inguinal lymphadenopathy.    CHEST: clear to auscultation bilaterally. Symmetrical breath movements bilaterally.    CVS: S1 and S2 are Regular rate and rhythm. No murmur or gallop or rub heard. No peripheral edema.    ABDOMEN: Soft. Not tender. Not distended. No palpable hepatomegaly or splenomegaly. No other mass palpable. Bowel sounds heard.    EXTREMITIES: Warm.  Minimal arthritic changes.    NEUROLOGICAL: Alert awake oriented.  Otherwise intact.  Cranial nerves appears to be preserved.    SKIN: no rash, or bruising or purpura.      Laboratory data      Recent Results (from the past 168 hour(s))   Glucose by meter   Result Value Ref Range    GLUCOSE BY METER POCT 148 (H) 70 - 99 mg/dL   Fine Needle Aspirate Pancreas   Result Value Ref Range    Final Diagnosis       Specimen A     Interpretation:      Positive for low grade neuroendocrine neoplasm (see comment)     Adequacy:     Satisfactory for evaluation          Comment       " Immunostaining is performed with appropriate controls on the cellblock.  The tumor cells are strongly and diffusely positive for synaptophysin, show strong nuclear staining with INSM1.  The Ki-67 proliferative appears to be overall less than 1%, however final grading of this tumor is best performed on the resection specimen.      Clinical Information       The patient is a 64 year old woman with an enhancing mass of the pancreatic tail measuring 1.2 cm.      Rapid Onsite Evaluation       FNA Performance:   Fine needle aspiration was not performed by [facility] staff.    Aspirate immediate study/adequacy:  I, VESTA BERMUDEZ MD, attest that I immediately examined smears while the procedure was underway and determined or confirmed the adequacy of the specimens via telepathology.    It is of note that the final assessment and report may be performed and signed by a different pathologist.    Onsite adequacy/interpretation:  A: Adequate        Gross Description       A(1). Pancreas, pancreatic tail mass:A. Pancreas, pancreatic tail mass, Fine Needle Aspirate:  Received are 1 fixed slides, processed for Pap stain, 2 air dried slides, processed for Diff Quik stain, and material in formalin, processed for one hematoxylin stained cell block.         Microscopic Description       A microscopic examination was performed.     Case was reviewed by the following:  EMILIO Resident/Fellow: Hien Otto MD  A resident or fellow in a training program was involved in the initial review, preparation, and/or interpretation of this case.  I, as the senior physician, attest that I have personally reviewed all specimens and or slides, including the listed special stains, and used them with my medical judgement to determine the final diagnosis.              Abnormal Result? Yes (A) No    Performing Labs       The technical component of this testing was completed at [facility] and [facility].     Stain controls for all stains resulted within this report have been reviewed and show appropriate reactivity.      Glucose by meter   Result Value Ref Range    GLUCOSE BY METER POCT 125 (H) 70 - 99 mg/dL       Imaging results        CT Abdomen Pelvis w Contrast    Result Date: 7/7/2024  EXAM: CT ABDOMEN PELVIS W CONTRAST LOCATION: [facility] DATE: 7/7/2024 INDICATION: right flank pain COMPARISON: None. TECHNIQUE: CT scan of the abdomen and pelvis was performed following injection of IV contrast. Multiplanar reformats were obtained. Dose reduction techniques were used. CONTRAST: isovue 370  75ml FINDINGS: LOWER CHEST: Normal. HEPATOBILIARY: The liver is enlarged measuring up to 22 cm in craniocaudal dimension. Few subcentimeter hypoattenuating hepatic lesions are too small to characterize but do not require specific follow-up. No radiopaque gallstone. No biliary ductal dilatation. PANCREAS: Enhancing mass of the pancreatic tail measuring 1.2 x 1.2 cm (series 3, image 64). No dilatation of the main pancreatic duct. SPLEEN: Subcentimeter hypoattenuating lesion in the superior spleen, likely a benign cystic lesion. ADRENAL GLANDS: Normal. KIDNEYS/BLADDER: Delayed right renal enhancement. Mild right hydronephrosis secondary to an obstructing 2 mm calculus at the right ureterovesicular junction. Normal enhancement of the left kidney without hydronephrosis. Multiple left renal sinus cysts, not requiring specific follow-up. The urinary bladder is decompressed, limiting evaluation. BOWEL: No evidence of bowel obstruction. Normal appendix. A circular radiodensity within the gastric fundus is favored to represent an ingested tablet correlated with multi planar reformats. LYMPH NODES: Normal. VASCULATURE: Normal. PELVIC ORGANS: Lobulated contour of the uterus suggestive of multiple uterine fibroids. No adnexal mass. MUSCULOSKELETAL: Advanced degenerative  disc disease at L4-L5.     IMPRESSION: 1.  Enhancing mass of the pancreatic tail measuring 1.2 cm. Findings are concerning for a pancreatic neuroendocrine tumor. Recommend pancreatic MRI within one month. 2.  Obstructing 2 mm calculus in the right distal ureter at the ureterovesicular junction. There is mild right hydronephrosis and delayed enhancement of the right kidney. 3.  Hepatomegaly.         This note has been dictated using voice recognition software. Any grammatical or context distortions are unintentional and inherent to the software      Signed by: Osbaldo Cervantes MD      Again, thank you for allowing me to participate in the care of your patient.        Sincerely,        Osbaldo Cervantes MD

## 2024-07-24 NOTE — PROGRESS NOTES
[facility] Hematology and Oncology Consult Note    Patient: Ingrid Oakes  MRN: [id number]  Date of Service: Jul 24, 2024           Reason for consultation      Problem List Items Addressed This Visit          Digestive    Neuroendocrine tumor of pancreas (H28) - Primary    Relevant Orders    Cancer antigen 19-9    CEA    Chromogranin A    Adult Oncology/Hematology  Referral     Other Visit Diagnoses       Pancreatic mass        Relevant Orders    Cancer antigen 19-9    CEA    Chromogranin A    Adult Oncology/Hematology  Referral              Assessment / number of problems addressed      1.  Pancreatic neuroendocrine tumor  2.  Newly diagnosed diabetes  3.  Slightly overweight    Plan and medical decision making      Patient presenting with newly diagnosed pancreatic malignancy.Reviewed notes from each unique source.  Reviewed each unique test.    Ordered tests.    Independently interpreted lab tests and radiological exams performed by other physicians.  Personally reviewed the images of the CT scan.  Independent historian account obtained as noted from her .    Discussed with the patient and her  that this is a low-grade tumor that can easily be watched.  Surgical resection is another option.  We will make a referral to see Dr. Nelson from hepatobiliary surgery.  Advised to get her blood sugars and diabetes under better control.  Improve her lifestyle etc.  Follow-up with me after she has seen Dr. Nelson.  Get some baseline labs including tumor markers.    Clinical/pathological stage      Cancer Staging   No matching staging information was found for the patient.      History of present illness      Ms. Ingrid Oakes is a 64 year old woman who was diagnosed with 15 mm sized pancreatic neuroendocrine tumor.  This presented incidentally when she had a CT scan done looking for kidney stone in June 2024.  That showed a lesion in her distal pancreas.  She was then referred to  Dr. Michael at [facility].  Dr. Michael did EUS and biopsy which confirmed low-grade neuroendocrine tumor.    The patient has been recently diagnosed with diabetes.  Her hemoglobin A1c was over 9.  Her blood sugars have been running high.    Comes in today accompanied by her .    Detailed review of systems      A 14 point review of systems was obtained.  Positive findings noted in the history.  Rest of the review of system is otherwise negative.      Past medical/surgical/social/family history        Past Medical History:   Diagnosis Date    Abnormal finding on imaging     Hypothyroidism     Irritable bowel syndrome     Mild recurrent major depression (H24)     Nephrolithiasis     RENE (obstructive sleep apnea)     Type 2 diabetes mellitus without complication (H)      Past Surgical History:   Procedure Laterality Date    ENDOSCOPIC ULTRASOUND UPPER GASTROINTESTINAL TRACT (GI) N/A 7/19/2024    Procedure: ENDOSCOPIC ULTRASOUND, ESOPHAGOSCOPY / UPPER GASTROINTESTINAL TRACT (GI), Fine needle aspiration;  Surgeon: Terell Michael MD;  Location: [facility] OR    SURGICAL HISTORY OF -   1988    dermoid tumor bilateral ovaries    SURGICAL HISTORY OF -       c/section     Family History   Problem Relation Age of Onset    Anesthesia Reaction Mother         Difficulty waking. Was paranoid    Lymphoma Mother     Thyroid Disease Mother     Hypertension Father     Diabetes Brother     Hypertension Brother     Depression Brother     Osteoporosis Paternal Grandmother     Diabetes Maternal Aunt     Lymphoma Maternal Aunt     Thyroid Cancer Cousin     Colon Cancer Maternal Uncle     Breast Cancer Paternal Aunt     Bleeding Disorder No family hx of     Clotting Disorder No family hx of      Social History     Socioeconomic History    Marital status:      Spouse name: None    Number of children: None    Years of education: None    Highest education level: None   Tobacco Use    Smoking status: Never     Passive  "exposure: Never   Vaping Use    Vaping status: Never Used   Substance and Sexual Activity    Alcohol use: Yes     Alcohol/week: 1.0 - 2.0 standard drink of alcohol     Types: 1 Standard drinks or equivalent per week     Comment: per month    Drug use: No    Sexual activity: Yes     Partners: Male           Allergies      Allergies   Allergen Reactions    Sulfa Antibiotics      Mother and brother allergic, she has not tried it         Physical exam        /75 (BP Location: Right arm, Patient Position: Sitting, Cuff Size: Adult Large)   Pulse 88   Temp 99  F (37.2  C) (Tympanic)   Resp 18   Ht 1.734 m (5' 8.25\")   Wt 121.1 kg (267 lb)   LMP  (LMP Unknown)   SpO2 98%   BMI 40.30 kg/m        GENERAL: Alert and oriented to time place and person. Seated comfortably. In no distress.    HEAD: Atraumatic and normocephalic.    EYES: ELINA, EOMI. No pallor. No icterus.    Oral cavity: no mucosal lesion or tonsillar enlargement.    NECK: supple. JVP normal.No thyroid enlargement.    LYMPH NODES: No palpable, cervical, axillary or inguinal lymphadenopathy.    CHEST: clear to auscultation bilaterally. Symmetrical breath movements bilaterally.    CVS: S1 and S2 are Regular rate and rhythm. No murmur or gallop or rub heard. No peripheral edema.    ABDOMEN: Soft. Not tender. Not distended. No palpable hepatomegaly or splenomegaly. No other mass palpable. Bowel sounds heard.    EXTREMITIES: Warm.  Minimal arthritic changes.    NEUROLOGICAL: Alert awake oriented.  Otherwise intact.  Cranial nerves appears to be preserved.    SKIN: no rash, or bruising or purpura.      Laboratory data      Recent Results (from the past 168 hour(s))   Glucose by meter   Result Value Ref Range    GLUCOSE BY METER POCT 148 (H) 70 - 99 mg/dL   Fine Needle Aspirate Pancreas   Result Value Ref Range    Final Diagnosis       Specimen A     Interpretation:      Positive for low grade neuroendocrine neoplasm (see " comment)     Adequacy:     Satisfactory for evaluation          Comment       Immunostaining is performed with appropriate controls on the cellblock.  The tumor cells are strongly and diffusely positive for synaptophysin, show strong nuclear staining with INSM1.  The Ki-67 proliferative appears to be overall less than 1%, however final grading of this tumor is best performed on the resection specimen.      Clinical Information       The patient is a 64 year old woman with an enhancing mass of the pancreatic tail measuring 1.2 cm.      Rapid Onsite Evaluation       FNA Performance:   Fine needle aspiration was not performed by [facility] staff.    Aspirate immediate study/adequacy:  I, VESTA BERMUDEZ MD, attest that I immediately examined smears while the procedure was underway and determined or confirmed the adequacy of the specimens via telepathology.    It is of note that the final assessment and report may be performed and signed by a different pathologist.    Onsite adequacy/interpretation:  A: Adequate        Gross Description       A(1). Pancreas, pancreatic tail mass:A. Pancreas, pancreatic tail mass, Fine Needle Aspirate:  Received are 1 fixed slides, processed for Pap stain, 2 air dried slides, processed for Diff Quik stain, and material in formalin, processed for one hematoxylin stained cell block.         Microscopic Description       A microscopic examination was performed.     Case was reviewed by the following:  EMILIO Resident/Fellow: Hien Otto MD  A resident or fellow in a training program was involved in the initial review, preparation, and/or interpretation of this case.  I, as the senior physician, attest that I have personally reviewed all specimens and or slides, including the listed special stains, and used them with my medical judgement to determine the final diagnosis.              Abnormal Result? Yes (A) No    Performing Labs       The technical component of  this testing was completed at [facility].     Stain controls for all stains resulted within this report have been reviewed and show appropriate reactivity.      Glucose by meter   Result Value Ref Range    GLUCOSE BY METER POCT 125 (H) 70 - 99 mg/dL       Imaging results        CT Abdomen Pelvis w Contrast    Result Date: 7/7/2024  EXAM: CT ABDOMEN PELVIS W CONTRAST LOCATION: [facility] DATE: 7/7/2024 INDICATION: right flank pain COMPARISON: None. TECHNIQUE: CT scan of the abdomen and pelvis was performed following injection of IV contrast. Multiplanar reformats were obtained. Dose reduction techniques were used. CONTRAST: isovue 370  75ml FINDINGS: LOWER CHEST: Normal. HEPATOBILIARY: The liver is enlarged measuring up to 22 cm in craniocaudal dimension. Few subcentimeter hypoattenuating hepatic lesions are too small to characterize but do not require specific follow-up. No radiopaque gallstone. No biliary ductal dilatation. PANCREAS: Enhancing mass of the pancreatic tail measuring 1.2 x 1.2 cm (series 3, image 64). No dilatation of the main pancreatic duct. SPLEEN: Subcentimeter hypoattenuating lesion in the superior spleen, likely a benign cystic lesion. ADRENAL GLANDS: Normal. KIDNEYS/BLADDER: Delayed right renal enhancement. Mild right hydronephrosis secondary to an obstructing 2 mm calculus at the right ureterovesicular junction. Normal enhancement of the left kidney without hydronephrosis. Multiple left renal sinus cysts, not requiring specific follow-up. The urinary bladder is decompressed, limiting evaluation. BOWEL: No evidence of bowel obstruction. Normal appendix. A circular radiodensity within the gastric fundus is favored to represent an ingested tablet correlated with multi planar reformats. LYMPH NODES: Normal. VASCULATURE: Normal. PELVIC ORGANS: Lobulated contour of the uterus suggestive of multiple  uterine fibroids. No adnexal mass. MUSCULOSKELETAL: Advanced degenerative disc disease at L4-L5.     IMPRESSION: 1.  Enhancing mass of the pancreatic tail measuring 1.2 cm. Findings are concerning for a pancreatic neuroendocrine tumor. Recommend pancreatic MRI within one month. 2.  Obstructing 2 mm calculus in the right distal ureter at the ureterovesicular junction. There is mild right hydronephrosis and delayed enhancement of the right kidney. 3.  Hepatomegaly.         This note has been dictated using voice recognition software. Any grammatical or context distortions are unintentional and inherent to the software      Signed by: Osbaldo Cervantes MD

## 2024-07-24 NOTE — PROGRESS NOTES
[facility]: Cancer Care                                                                                            Situation: Patient chart reviewed by care coordinator.    Background: Patient with a pancreatic mass.  Patient had an endoscopic ultrasound on 7/19.    Assessment: Patient comes in to see Dr Cervantes in consultation.  Dr Cervantes recommends that patient meet with Dr. Stef Go, surgeon and follow-up in about a month.  Patient is also having labs drawn today.    Plan/Recommendations: I will monitor to make sure patient gets an appointment set up with Dr. Go.  And make sure he follow-up is scheduled with Dr Cervantes sometime after that.    Signature:  Mari Ramirez RN

## 2024-07-24 NOTE — PROGRESS NOTES
"Oncology Rooming Note    July 24, 2024 12:56 PM   Ingrid Oakes is a 64 year old female who presents for:    Chief Complaint   Patient presents with    Oncology Clinic Visit     New patient consult     Initial Vitals: /75 (BP Location: Right arm, Patient Position: Sitting, Cuff Size: Adult Large)   Pulse 88   Temp 99  F (37.2  C) (Tympanic)   Resp 18   Ht 1.734 m (5' 8.25\")   Wt 121.1 kg (267 lb)   LMP  (LMP Unknown)   SpO2 98%   BMI 40.30 kg/m   Estimated body mass index is 40.3 kg/m  as calculated from the following:    Height as of this encounter: 1.734 m (5' 8.25\").    Weight as of this encounter: 121.1 kg (267 lb). Body surface area is 2.41 meters squared.  No Pain (0) Comment: Data Unavailable   No LMP recorded (lmp unknown). Patient is postmenopausal.  Allergies reviewed: Yes  Medications reviewed: Yes    Medications: Medication refills not needed today.  Pharmacy name entered into [facility]:    [id number] - [place], [place] - [address] AT [facility] OF  [address]  [id number] - [place], [place] - [address] AT [facility] [address]    Frailty Screening:   Is the patient here for a new oncology consult visit in cancer care? 1. Yes. Over the past month, have you experienced difficulty or required a caregiver to assist with:   1. Balance, walking or general mobility (including any falls)? NO  2. Completion of self-care tasks such as bathing, dressing, toileting, grooming/hygiene?  NO  3. Concentration or memory that affects your daily life?  NO       Clinical concerns: N/A       MANUELA BATES CMA            "

## 2024-07-25 ENCOUNTER — PATIENT OUTREACH (OUTPATIENT)
Dept: SURGERY | Facility: CLINIC | Age: 64
End: 2024-07-25
Payer: COMMERCIAL

## 2024-07-25 DIAGNOSIS — D3A.8 NEUROENDOCRINE TUMOR OF PANCREAS (H): Primary | ICD-10-CM

## 2024-07-25 LAB — CEA SERPL-MCNC: 4.3 NG/ML

## 2024-07-25 NOTE — PROGRESS NOTES
New Patient Oncology Nurse Navigator Note     Referring provider: Dr. Cervantes     Referring Clinic/Organization: [facility]     Referred to: Surgical Oncology -  Hepatobiliary / GI Cancers     Requested provider (if applicable): Dr. Stef Go     Referral Received: 07/25/24       Evaluation for :  Pancreatic neuroendocrine tumor      Clinical History (per Nurse review of records provided):      See book marked documents:     Referring MD office note  Pathology report  Imaging reports   Procedure report       Allergies   Allergen Reactions    Sulfa Antibiotics      Mother and brother allergic, she has not tried it        Past Medical History:   Diagnosis Date    Abnormal finding on imaging     Hypothyroidism     Irritable bowel syndrome     Mild recurrent major depression (H24)     Nephrolithiasis     RENE (obstructive sleep apnea)     Type 2 diabetes mellitus without complication (H)        Past Surgical History:   Procedure Laterality Date    ENDOSCOPIC ULTRASOUND UPPER GASTROINTESTINAL TRACT (GI) N/A 7/19/2024    Procedure: ENDOSCOPIC ULTRASOUND, ESOPHAGOSCOPY / UPPER GASTROINTESTINAL TRACT (GI), Fine needle aspiration;  Surgeon: Terell Michael MD;  Location: [facility] OR    SURGICAL HISTORY OF -   1988    dermoid tumor bilateral ovaries    SURGICAL HISTORY OF -       c/section       Current Outpatient Medications   Medication Sig Dispense Refill    acetaminophen (TYLENOL) 500 MG tablet Take 1,000 mg by mouth as needed for mild pain      acetaminophen (TYLENOL) 500 MG tablet Take 2 tablets (1,000 mg) by mouth every 6 hours 56 tablet 0    blood glucose (NO BRAND SPECIFIED) test strip Use to test blood sugar 2 times daily or as directed. 100 strip 0    blood glucose monitoring (NO BRAND SPECIFIED) meter device kit Use to test blood sugar 2 times daily or as directed. 1 kit 0    calcium carbonate (TUMS) 500 MG chewable tablet Take 1 chew tab by mouth daily as needed for heartburn      Cholecalciferol (VITAMIN  D PO) Take 1 tablet by mouth every evening      ibuprofen (ADVIL/MOTRIN) 200 MG tablet Take 200-400 mg by mouth as needed for pain      ibuprofen (ADVIL/MOTRIN) 200 MG tablet Take 2 tablets (400 mg) by mouth every 6 hours 56 tablet 0    l-lysine HCl 500 MG TABS tablet Take 500 mg by mouth nightly as needed      levothyroxine (SYNTHROID/LEVOTHROID) 200 MCG tablet Take 200 mcg by mouth daily      metFORMIN (GLUCOPHAGE) 500 MG tablet Take 1 tablet (500 mg) by mouth 2 times daily (with meals) 60 tablet 0    ZOLOFT 50 MG OR TABS Take 50 mg by mouth every evening          Patient Active Problem List   Diagnosis    Hypothyroidism    Obesity    Mild major depression (H)    IBS (irritable bowel syndrome)    CARDIOVASCULAR SCREENING; LDL GOAL LESS THAN 160    Neuroendocrine tumor of pancreas (H28)         Clinical Assessment / Barriers to Care (Per Nurse):    None at this time.     Records Location:     [facility]     Records Needed:     ABDOMINAL IMAGING (CT SCANS, MRI, US, PET SCANS)  DATING BACK TO 2018      Additional testing needed prior to consult:     Dotatate scan     Referral updates and Plan:       Consult with Surgical Oncology   PET scan     07/25/2024 10:49 AM - Called and left a detailed message with patient that our scheduling team would be reaching out to arrange additional scan and visit with Dr. Go to discuss further recommendations. Provided my call back number for any questions.     Yomaira Pizarro, RN, BSN   Surgical Oncology New Patient Nurse Navigator  [facility]  [phone]

## 2024-07-27 LAB
CANCER AG19-9 SERPL IA-ACNC: 10 U/ML
CGA SERPL-MCNC: 36 NG/ML

## 2024-07-29 LAB — UPPER EUS: NORMAL

## 2024-07-31 ENCOUNTER — DOCUMENTATION ONLY (OUTPATIENT)
Dept: ONCOLOGY | Facility: HOSPITAL | Age: 64
End: 2024-07-31
Payer: COMMERCIAL

## 2024-07-31 NOTE — PROGRESS NOTES
Patient stopped by clinic to drop off FMLA paperwork. Pt stated her work requires FMLA for any missed time related to medical appointments. Pt has only been seen once for consult on 07/24/24. Pt will be seeing Dr. Go for surgical consult on 08/16/24 and follow up with Dr. Cervantes on 08/21/24. I informed pt we would only be able to fill out paperwork for clinic appointments at this time, and we will fill out new paperwork at a later date once we have a treatment plan. Pt stated her work is asking for paperwork to be returned by 08/05/24. I informed pt that Dr. Cervantes is currently out of the country, and will return to clinic on 08/07/24. I instructed pt to let her HR know this information, and we will make sure to fax it to them no later than 08/08/24. Pt verbalized understanding of this information.

## 2024-08-06 ASSESSMENT — PATIENT HEALTH QUESTIONNAIRE - PHQ9
SUM OF ALL RESPONSES TO PHQ QUESTIONS 1-9: 1
SUM OF ALL RESPONSES TO PHQ QUESTIONS 1-9: 1
10. IF YOU CHECKED OFF ANY PROBLEMS, HOW DIFFICULT HAVE THESE PROBLEMS MADE IT FOR YOU TO DO YOUR WORK, TAKE CARE OF THINGS AT HOME, OR GET ALONG WITH OTHER PEOPLE: SOMEWHAT DIFFICULT

## 2024-08-06 NOTE — PROGRESS NOTES
[facility]  [address] [place] [id number]  Phone: [phone]  Fax: [phone]  Department of Surgery  Division of Surgical Oncology    New Patient Consultation    Patient:  Ingrid Oakes, Date of birth 1960  Date of Visit:  08/06/2024  Referring Provider Osbaldo Cervantes      Assessment & Plan      Ingrid Oakes is a 64 year old female with well-differentiated neuroendocrine tumor of the pancreas    The natural history of pancreatic neuroendocrine tumors was discussed with the patient and accompanying family members and/or other guests.    Pancreatic neuroendocrine tumors (pNETs) can be a complex topic. pNETs are abnormal growths that are not benign - they can grow and metastasize - but they are distinct from invasive cancers. Well-differentiated NETs do not evolve/transform into cancers and should not be thought as pre-cancerous or cancerous. In fact, these tend to be quite indolent and low-risk. Retrospective and recent prospective studies have demonstrated that, if we just observe small low-grade NETs, the majority do not grow and, even if they do grow, are extremely unlikely to metastasize. As a result, we routinely recommend surveillance for any low-grade pNETs under 2cm. Other risk factors like growth or tumor grade may push us towards surgery. These tumors can secrete hormones and surgery would be appropriate in these cases; based on this patient's history, I do not suspect a functional pNET.    If surgery were ever recommended, the surgery necessary for this NET would be a distal pancreatectomy. We briefly went through the steps of that procedure, expected recovery, and the risk of complications, which almost certainly outweigh the risks of leaving this tumor in place.    Therefore, I am recommending ongoing surveillance, which can be done with a combination of cross-sectional imaging (DOTATATE PET, arterial-contrast phase of a CT, or MRI) or  endoscopic ultrasounds. I do not recommend blood tests, as they have been shown to be unreliable surveillance tests.    I am aware that the patient has some hypodensities seen on the CT scan from July.  These are too small to characterize but are not the same enhancement as her neuroendocrine tumors on the relevant phases of contrast so these seem unlikely to be concerning for metastatic sites.  These were not seen on her dotatate scan.    All questions were answered to their apparent satisfaction, and contact information was provided should additional questions or concerns arise.    Plan Summary:  -Recommend surveillance for low-grade sub-2 cm pancreatic neuroendocrine tumor        Stef Go MD MPHS        Medical Decision Making    Today's visit was centered around a new tumor diagnosis the risk of additional morbidity or mortality with or without further treatment is moderate. Total time spent was 60 minutes, including but not limited to patient-facing time, chart review, review of tests/studies as noted above, and care coordination.             History of Present Illness     Pertinent history obtained from: chart review and patient    CT done for R flank pain showed a 2mm right distal ureter stone and associated mild hydronephrosis, hepatomegaly, and an enhancing 1.2cm pancreatic tail mass.    EUS was done on 7/19/24 by Dr. Michael. He idenfitied a round 16mm mass in the tail. Pathology was positive for neuroendocrine tumor, Ki 67 <1%.    DOTATATE PET scan was done on 8/12, and showed no evidence of extrapancreatic spread    She denies shortness of breath, wheezing, flushing, diarrhea, or palpitations.  She has no family history of parathyroid pituitary or pancreatic pathologies.    Past Medical History, Past Surgical History, and Family History reviewed - see appropriate tabs in EMR  Data   Laboratory data and imaging listed below were reviewed as part of this encounter.     Labs:  Labs reviewed from  July include chromogranin, CEA, CA 19-9    Imaging:  CT scan from 7 7 and dotatate PET from 812 reviewed    EUS report from July 19, 2024 reviewed

## 2024-08-07 ENCOUNTER — OFFICE VISIT (OUTPATIENT)
Dept: FAMILY MEDICINE | Facility: CLINIC | Age: 64
End: 2024-08-07
Payer: COMMERCIAL

## 2024-08-07 ENCOUNTER — DOCUMENTATION ONLY (OUTPATIENT)
Dept: ONCOLOGY | Facility: HOSPITAL | Age: 64
End: 2024-08-07

## 2024-08-07 VITALS
HEART RATE: 75 BPM | OXYGEN SATURATION: 95 % | TEMPERATURE: 97.9 F | DIASTOLIC BLOOD PRESSURE: 58 MMHG | RESPIRATION RATE: 12 BRPM | WEIGHT: 265.5 LBS | SYSTOLIC BLOOD PRESSURE: 126 MMHG | HEIGHT: 69 IN | BODY MASS INDEX: 39.32 KG/M2

## 2024-08-07 DIAGNOSIS — E03.8 OTHER SPECIFIED HYPOTHYROIDISM: ICD-10-CM

## 2024-08-07 DIAGNOSIS — Z23 ENCOUNTER FOR VACCINATION: ICD-10-CM

## 2024-08-07 DIAGNOSIS — K21.00 GASTROESOPHAGEAL REFLUX DISEASE WITH ESOPHAGITIS WITHOUT HEMORRHAGE: ICD-10-CM

## 2024-08-07 DIAGNOSIS — E11.9 TYPE 2 DIABETES MELLITUS WITHOUT COMPLICATION, WITHOUT LONG-TERM CURRENT USE OF INSULIN (H): Primary | ICD-10-CM

## 2024-08-07 PROBLEM — E66.01 CLASS 2 SEVERE OBESITY DUE TO EXCESS CALORIES WITH SERIOUS COMORBIDITY IN ADULT (H): Status: ACTIVE | Noted: 2024-08-07

## 2024-08-07 PROBLEM — E66.812 CLASS 2 SEVERE OBESITY DUE TO EXCESS CALORIES WITH SERIOUS COMORBIDITY IN ADULT (H): Status: ACTIVE | Noted: 2024-08-07

## 2024-08-07 LAB
CHOLEST SERPL-MCNC: 135 MG/DL
CREAT UR-MCNC: 186 MG/DL
FASTING STATUS PATIENT QL REPORTED: ABNORMAL
HDLC SERPL-MCNC: 41 MG/DL
LDLC SERPL CALC-MCNC: 76 MG/DL
MICROALBUMIN UR-MCNC: 13.4 MG/L
MICROALBUMIN/CREAT UR: 7.2 MG/G CR (ref 0–25)
NONHDLC SERPL-MCNC: 94 MG/DL
TRIGL SERPL-MCNC: 91 MG/DL

## 2024-08-07 PROCEDURE — 80061 LIPID PANEL: CPT | Performed by: FAMILY MEDICINE

## 2024-08-07 PROCEDURE — 82570 ASSAY OF URINE CREATININE: CPT | Performed by: FAMILY MEDICINE

## 2024-08-07 PROCEDURE — 90471 IMMUNIZATION ADMIN: CPT | Performed by: FAMILY MEDICINE

## 2024-08-07 PROCEDURE — 99204 OFFICE O/P NEW MOD 45 MIN: CPT | Mod: 25 | Performed by: FAMILY MEDICINE

## 2024-08-07 PROCEDURE — 82043 UR ALBUMIN QUANTITATIVE: CPT | Performed by: FAMILY MEDICINE

## 2024-08-07 PROCEDURE — 36415 COLL VENOUS BLD VENIPUNCTURE: CPT | Performed by: FAMILY MEDICINE

## 2024-08-07 PROCEDURE — 90715 TDAP VACCINE 7 YRS/> IM: CPT | Performed by: FAMILY MEDICINE

## 2024-08-07 RX ORDER — FAMOTIDINE 40 MG/1
40 TABLET, FILM COATED ORAL DAILY
Qty: 90 TABLET | Refills: 0 | Status: SHIPPED | OUTPATIENT
Start: 2024-08-07

## 2024-08-07 NOTE — PROGRESS NOTES
"  Assessment & Plan     Type 2 diabetes mellitus without complication, without long-term current use of insulin (H)  Chronic, not well controlled. Will continue current management of metformin as patient has minimal side effects and blood sugars on average below 150 fasting. Will check lipids for cardiovascular risk assessment, microalbumin levels.   - Primary Care Referral  - Lipid panel reflex to direct LDL Non-fasting  - metFORMIN (GLUCOPHAGE) 500 MG tablet  Dispense: 180 tablet; Refill: 1  - Albumin Random Urine Quantitative with Creat Ratio  - PRIMARY CARE FOLLOW-UP SCHEDULING  - Lipid panel reflex to direct LDL Non-fasting  - Albumin Random Urine Quantitative with Creat Ratio    Other specified hypothyroidism  Chronic, stable. Managed by outside endocrinologist. Agree with 200 mcg levothyroxine every day.     Gastroesophageal reflux disease with esophagitis without hemorrhage  Chronic, not well controlled.Given patient's recent kidney stone, discussed use of anti histamine to provider longer term control, but encouraged patient against long term use. Patient will use while she is making lifestyle changes.   - famotidine (PEPCID) 40 MG tablet  Dispense: 90 tablet; Refill: 0    Encounter for vaccination  - TDAP 7+ (ADACEL,BOOSTRIX)    I spent a total of 34 minutes on the day of the visit.   Time spent by me doing chart review, history and exam, documentation and further activities per the note    The longitudinal plan of care for the diagnosis(es)/condition(s) as documented were addressed during this visit. Due to the added complexity in care, I will continue to support [place] in the subsequent management and with ongoing continuity of care.    MED REC REQUIRED  Post Medication Reconciliation Status:  Patient was not discharged from an inpatient facility or TCU  BMI  Estimated body mass index is 39.21 kg/m  as calculated from the following:    Height as of this encounter: 1.753 m (5' 9\").    Weight as of this " "encounter: 120.4 kg (265 lb 8 oz).   Weight management plan: Discussed healthy diet and exercise guidelines      See Patient Instructions    Subjective   Ingrid is a 64 year old, presenting for the following health issues:  Hospital F/U (From (07/07/24))      8/7/2024     8:09 AM   Additional Questions   Roomed by Homer Alvarenga   Accompanied by      Via the Health Maintenance questionnaire, the patient has reported the following services have been completed -Eye Exam: [facility] 2024-06-14, this information has been sent to the abstraction team.  [facility]     ED/ Followup:    Facility:  [facility]  Date of visit: 07/07/2024  Reason for visit: Stone, Type II Diabetes, and Pancreatic Mass  Current Status: Patient is doing well today. She is taking her blood sugar regularly, however it has been on the raise. Average blood sugars have been in the controlled range. She is taking the blood sugars in the AM and PM.     Patient has an upcoming PET scan on Monday, August 12th. She will be following with Dr. Nelson.     Patient had changes to her lifestyle which is also causing her blood sugars to be more controlled, requiring further monitoring.       Review of Systems  Constitutional, neuro, ENT, endocrine, pulmonary, cardiac, gastrointestinal, genitourinary, musculoskeletal, integument and psychiatric systems are negative, except as otherwise noted.      Objective    /58   Pulse 75   Temp 97.9  F (36.6  C) (Oral)   Resp 12   Ht 1.753 m (5' 9\")   Wt 120.4 kg (265 lb 8 oz)   LMP  (LMP Unknown)   SpO2 95%   BMI 39.21 kg/m    Body mass index is 39.21 kg/m .  Physical Exam   GENERAL: alert and no distress  EYES: Eyes grossly normal to inspection, PERRL and conjunctivae and sclerae normal  NECK: no adenopathy, no asymmetry, masses, or scars  MS: no gross musculoskeletal defects noted, no edema  SKIN: no suspicious lesions or rashes    Lab Results   Component Value Date    A1C 8.7 07/07/2024     No results " found for this or any previous visit (from the past 24 hour(s)).        Signed Electronically by: WENDI CAMERON,     Answers submitted by the patient for this visit:  Patient Health Questionnaire (Submitted on 8/6/2024)  If you checked off any problems, how difficult have these problems made it for you to do your work, take care of things at home, or get along with other people?: Somewhat difficult  PHQ9 TOTAL SCORE: 1

## 2024-08-07 NOTE — RESULT ENCOUNTER NOTE
Hello -    Here are my comments about your recent results:  -Microalbumin (urine protein) test is normal.  ADVISE: rechecking this annually.  For additional lab test information, labtestsonline.org is an excellent reference..    Please let us know if you have any questions or concerns.     Regards,  WENDI CAMERON, DO

## 2024-08-07 NOTE — PROGRESS NOTES
Form Type: FMLA/Disability    Care Team Physician: Dr. Osbaldo Cervantes    Date Faxed: 08/07/2024    Recipient: [employer] at [phone]    SHAHEEN & Copy of Forms Sent to MR: YES

## 2024-08-09 ENCOUNTER — TELEPHONE (OUTPATIENT)
Dept: FAMILY MEDICINE | Facility: CLINIC | Age: 64
End: 2024-08-09
Payer: COMMERCIAL

## 2024-08-09 NOTE — RESULT ENCOUNTER NOTE
LDL 78 with no history of CVD, can either consider observing and rechecking in six months or start moderate intensity statin (10 mg atorvastatin) and follow up in 6-12 months.     Let me know her thoughts.     WENDI CAMERON, DO

## 2024-08-09 NOTE — TELEPHONE ENCOUNTER
Relayed Dr. Blankenship's result message.     Pt states she would rather wait and retest in 6 months as she continues to work on her diabetic diet.

## 2024-08-09 NOTE — TELEPHONE ENCOUNTER
----- Message from WENDI CAMERON sent at 8/9/2024  9:00 AM CDT -----  LDL 76 with no history of CVD, can either consider observing and rechecking in six months or start moderate intensity statin (10 mg atorvastatin) and follow up in 6-12 months.     Let me know her thoughts.     WENDI CAMERON, DO

## 2024-08-12 ENCOUNTER — HOSPITAL ENCOUNTER (OUTPATIENT)
Dept: PET IMAGING | Facility: HOSPITAL | Age: 64
Discharge: HOME | End: 2024-08-12
Attending: SURGERY | Admitting: SURGERY
Payer: COMMERCIAL

## 2024-08-12 DIAGNOSIS — D3A.8 NEUROENDOCRINE TUMOR OF PANCREAS (H): ICD-10-CM

## 2024-08-12 PROCEDURE — 250N000011 HC RX IP 250 OP 636: Performed by: SURGERY

## 2024-08-12 PROCEDURE — 78815 PET IMAGE W/CT SKULL-THIGH: CPT | Mod: PI

## 2024-08-12 PROCEDURE — A9592 HC RX IP 250 OP 636: HCPCS | Performed by: SURGERY

## 2024-08-12 RX ADMIN — COPPER CU 64 DOTATATE 4.16 MILLICURIE: 1 INJECTION, SOLUTION INTRAVENOUS at 07:14

## 2024-08-13 ENCOUNTER — VIRTUAL VISIT (OUTPATIENT)
Dept: UROLOGY | Facility: CLINIC | Age: 64
End: 2024-08-13
Payer: COMMERCIAL

## 2024-08-13 DIAGNOSIS — N20.1 URETERAL STONE: ICD-10-CM

## 2024-08-13 DIAGNOSIS — N20.1 RIGHT URETERAL STONE: Primary | ICD-10-CM

## 2024-08-13 PROCEDURE — 99204 OFFICE O/P NEW MOD 45 MIN: CPT | Mod: 95 | Performed by: UROLOGY

## 2024-08-13 RX ORDER — TAMSULOSIN HYDROCHLORIDE 0.4 MG/1
0.4 CAPSULE ORAL DAILY
Qty: 30 CAPSULE | Refills: 0 | Status: SHIPPED | OUTPATIENT
Start: 2024-08-13

## 2024-08-13 NOTE — PROGRESS NOTES
Virtual Visit Details    Type of service:  Video Visit     Originating Location (pt. Location): Home    Distant Location (provider location):  On-site  Platform used for Video Visit: Tao    Name: Ingrid Oakes   MRN: [id number]  YOB: 1960    Assessment and Plan:  64 year old female with 2 mm right ureteral stone that has not passed for over 4 weeks.     1. Right Ureteral stone    We discussed observation, shock wave lithotripsy, and ureteroscopy  as the main surgical approaches to upper urinary tract stones.  We reviewed risks and benefits including but not limited to the following: bleeding, infection, damage to adjacent organs, ureteral stricture, need for staged treatment, incomplete stone removal.    Ultimately the patient has elected to proceed with ureteroscopy because of ability to remove the stone    We discussed the benefits and risks of right ureteroscopy, including but not limited to, bleeding, infection, need for stent/stent related symptoms, injury to ureter, need for second procedure if unable to reach stone or residual fragments.       Plan:  -  30 min right ureteroscopy  - start tamsulosin 0.4 mg         Sinan Travis MD  August 13, 2024         Chief Complaint: Right ureteral stone    History of Present Illness:  Ingrid Oakes is a 64 year old female seen in consultation from Dr. Posey for discussion of right ureteral stone.      The current episode was first found due to ER work up for flank pain on 7/7/2024.  I personally reviewed CT scan of the abdomen pelvis from 7/7/2024 demonstrates a 2 mm right UVJ stone. Patient had a pet scan on August 12, 2024 which redemonstrated stone in similar position..      Currently, they are experiencing no flank pain, no nausea, no vomiting, no hematuria, or no dysuria.  They have not experienced recent stone passage.     Pertinent stone history:  -- Personal stone history  -- Prior stone procedure  -- Prior stone analysis  -- Prior  metabolic evaluation  -- Family stone history    Pertinent stone medical history  + Obesity (There is no height or weight on file to calculate BMI.)  + Diabetes  -- Neurologic disease limiting mobility   -- Osteoporosis  -- Gout  -- Gastric bypass surgery  -- Sarcoidosis  -- Inflammatory bowel disease  -- History of non-stone  surgery     Pertinent medications:  -- Antiplatelet  -- Anticoagulant  -- Topiramate   -- Thiazide  -- Potassium supplement      I reviewed internal labs, of which pertinent ones include:   Hemoglobin   Date Value Ref Range Status   07/07/2024 14.9 11.7 - 15.7 g/dL Final     Potassium   Date Value Ref Range Status   07/07/2024 4.2 3.4 - 5.3 mmol/L Final     Creatinine   Date Value Ref Range Status   07/07/2024 1.07 (H) 0.51 - 0.95 mg/dL Final     pH Urine   Date Value Ref Range Status   07/07/2024 5.5 5.0 - 7.0 Final       Calcium   Date Value Ref Range Status   07/07/2024 9.3 8.8 - 10.2 mg/dL Final         I reviewed internal records which in summarized above.         Past Medical History:  Past Medical History:   Diagnosis Date    Abnormal finding on imaging     Cancer (H) Suspected 7/2024    Depressive disorder 1999    Have taken sertraline since diagnosis    Hernia, abdominal 1962    Umbilical hernia repaired in 1963    Hypothyroidism     Irritable bowel syndrome     Mild recurrent major depression (H24)     Mumps 1970's    Nephrolithiasis     RENE (obstructive sleep apnea)     Palpitations 2000's    Excess caffeine brings on palpitations    Type 2 diabetes mellitus without complication (H)             Past Surgical History:  Past Surgical History:   Procedure Laterality Date    ABDOMEN SURGERY  12/97    Caesarean section    ENDOSCOPIC ULTRASOUND UPPER GASTROINTESTINAL TRACT (GI) N/A 07/19/2024    Procedure: ENDOSCOPIC ULTRASOUND, ESOPHAGOSCOPY / UPPER GASTROINTESTINAL TRACT (GI), Fine needle aspiration;  Surgeon: Terell Michael MD;  Location: [facility] OR    GYN SURGERY  10/88    Removal  of bilateral ovarian tumors (benign)    HERNIA REPAIR  approx. 1963    Umbilical hernia    SURGICAL HISTORY OF -   1988    dermoid tumor bilateral ovaries    SURGICAL HISTORY OF -       c/section            Social History:  Social History     Tobacco Use    Smoking status: Never     Passive exposure: Never    Smokeless tobacco: Never   Vaping Use    Vaping status: Never Used   Substance Use Topics    Alcohol use: Yes     Alcohol/week: 1.0 - 2.0 standard drink of alcohol     Types: 1 Standard drinks or equivalent per week     Comment: One or two drinks per month    Drug use: No            Family History:  Family History   Problem Relation Age of Onset    Anesthesia Reaction Mother         Difficulty waking. Was paranoid    Lymphoma Mother     Thyroid Disease Mother     Other Cancer Mother          of B-cell lymphoma    Obesity Mother     Cancer Mother          of B-cell lymphoma    Hypertension Father     Myocardial Infarction Father          of myocardial infarction    Diabetes Brother     Hypertension Brother     Depression Brother     Osteoporosis Paternal Grandmother     Obesity Paternal Grandmother     Heart Disease Paternal Grandmother     Diabetes Maternal Aunt     Lymphoma Maternal Aunt     Thyroid Cancer Cousin     Colon Cancer Maternal Uncle     Breast Cancer Paternal Aunt     Obesity Maternal Grandmother     Diabetes Other         Maternal aunt    Other Cancer Other         Maternal aunt;  of B-cell lymphoma    Obesity Other     Cancer Other         Maternal aunt,  of B-cell lymphoma    Diabetes Brother     Hypertension Brother     Depression Brother     Obesity Brother     Other Cancer Other         Maternal uncle;  of colon cancer    Colon Cancer Other         Maternal uncle,  of colon cancer    Other Cancer Other         Paternal aunt;  of breast cancer    Cancer Other         Paternal aunt,  of breast cancer    Other Cancer Cousin         Malgorzata Royal's  "daughter;  of thyroid cancer    Obesity Cousin     Thyroid Disease Other         Paternal aunt; Graves's Disease    Bleeding Disorder No family hx of     Clotting Disorder No family hx of             Allergies:  Allergies   Allergen Reactions    Sulfa Antibiotics      Mother and brother allergic, she has not tried it            Medications:  Current Outpatient Medications   Medication Sig Dispense Refill    acetaminophen (TYLENOL) 500 MG tablet Take 1,000 mg by mouth as needed for mild pain      blood glucose (NO BRAND SPECIFIED) test strip Use to test blood sugar 2 times daily or as directed. 100 strip 0    blood glucose monitoring (NO BRAND SPECIFIED) meter device kit Use to test blood sugar 2 times daily or as directed. 1 kit 0    Cholecalciferol (VITAMIN D PO) Take 1 tablet by mouth every evening      famotidine (PEPCID) 40 MG tablet Take 1 tablet (40 mg) by mouth daily 90 tablet 0    ibuprofen (ADVIL/MOTRIN) 200 MG tablet Take 200-400 mg by mouth as needed for pain      l-lysine HCl 500 MG TABS tablet Take 500 mg by mouth nightly as needed      levothyroxine (SYNTHROID/LEVOTHROID) 200 MCG tablet Take 200 mcg by mouth daily      metFORMIN (GLUCOPHAGE) 500 MG tablet Take 1 tablet (500 mg) by mouth 2 times daily (with meals) 180 tablet 1    ZOLOFT 50 MG OR TABS Take 50 mg by mouth every evening       No current facility-administered medications for this visit.       Physical Exam            Physical Exam:  B/P: Data Unavailable, T: Data Unavailable, P: Data Unavailable, R: Data Unavailable  Estimated body mass index is 39.21 kg/m  as calculated from the following:    Height as of 24: 1.753 m (5' 9\").    Weight as of 24: 120.4 kg (265 lb 8 oz).  General Appearance Adult: Alert, no acute distress, oriented  "

## 2024-08-13 NOTE — NURSING NOTE
Current patient location: [address] [place] [id number]    Is the patient currently in the state of [place]? YES    Visit mode:VIDEO    If the visit is dropped, the patient can be reconnected by: VIDEO VISIT: Text to cell phone:   Telephone Information:   Mobile [phone]       Will anyone else be joining the visit? NO  (If patient encounters technical issues they should call [phone]    How would you like to obtain your AVS? MyChart    Are changes needed to the allergy or medication list? No, Pt stated no changes to allergies, and Pt stated no med changes    Are refills needed on medications prescribed by this physician? NO    Rooming Documentation:  Not applicable      Reason for visit: RECHECK and Consult    Didi IRELAND

## 2024-08-13 NOTE — LETTER
8/13/2024       RE: Ingrid Oakes  [address] [facility] [address]     Dear Colleague,    Thank you for referring your patient, Ingrid Oakes, to the [facility]. Please see a copy of my visit note below.    Virtual Visit Details    Type of service:  Video Visit     Originating Location (pt. Location): Home    Distant Location (provider location):  On-site  Platform used for Video Visit: Webjam    Name: Ingrid Oakes   MRN: [id number]  YOB: 1960    Assessment and Plan:  64 year old female with 2 mm right ureteral stone that has not passed for over 4 weeks.     1. Right Ureteral stone    We discussed observation, shock wave lithotripsy, and ureteroscopy  as the main surgical approaches to upper urinary tract stones.  We reviewed risks and benefits including but not limited to the following: bleeding, infection, damage to adjacent organs, ureteral stricture, need for staged treatment, incomplete stone removal.    Ultimately the patient has elected to proceed with ureteroscopy because of ability to remove the stone    We discussed the benefits and risks of right ureteroscopy, including but not limited to, bleeding, infection, need for stent/stent related symptoms, injury to ureter, need for second procedure if unable to reach stone or residual fragments.       Plan:  -  30 min right ureteroscopy  - start tamsulosin 0.4 mg         Sinan Travis MD  August 13, 2024         Chief Complaint: Right ureteral stone    History of Present Illness:  Ingrid Oakes is a 64 year old female seen in consultation from Dr. Posey for discussion of right ureteral stone.      The current episode was first found due to ER work up for flank pain on 7/7/2024.  I personally reviewed CT scan of the abdomen pelvis from 7/7/2024 demonstrates a 2 mm right UVJ stone. Patient had a pet scan on August 12, 2024 which redemonstrated  stone in similar position..      Currently, they are experiencing no flank pain, no nausea, no vomiting, no hematuria, or no dysuria.  They have not experienced recent stone passage.     Pertinent stone history:  -- Personal stone history  -- Prior stone procedure  -- Prior stone analysis  -- Prior metabolic evaluation  -- Family stone history    Pertinent stone medical history  + Obesity (There is no height or weight on file to calculate BMI.)  + Diabetes  -- Neurologic disease limiting mobility   -- Osteoporosis  -- Gout  -- Gastric bypass surgery  -- Sarcoidosis  -- Inflammatory bowel disease  -- History of non-stone  surgery     Pertinent medications:  -- Antiplatelet  -- Anticoagulant  -- Topiramate   -- Thiazide  -- Potassium supplement      I reviewed internal labs, of which pertinent ones include:   Hemoglobin   Date Value Ref Range Status   07/07/2024 14.9 11.7 - 15.7 g/dL Final     Potassium   Date Value Ref Range Status   07/07/2024 4.2 3.4 - 5.3 mmol/L Final     Creatinine   Date Value Ref Range Status   07/07/2024 1.07 (H) 0.51 - 0.95 mg/dL Final     pH Urine   Date Value Ref Range Status   07/07/2024 5.5 5.0 - 7.0 Final       Calcium   Date Value Ref Range Status   07/07/2024 9.3 8.8 - 10.2 mg/dL Final         I reviewed internal records which in summarized above.         Past Medical History:  Past Medical History:   Diagnosis Date     Abnormal finding on imaging      Cancer (H) Suspected 7/2024     Depressive disorder 1999    Have taken sertraline since diagnosis     Hernia, abdominal 1962    Umbilical hernia repaired in 1963     Hypothyroidism      Irritable bowel syndrome      Mild recurrent major depression (H24)      Mumps 1970's     Nephrolithiasis      RENE (obstructive sleep apnea)      Palpitations 2000's    Excess caffeine brings on palpitations     Type 2 diabetes mellitus without complication (H)             Past Surgical History:  Past Surgical History:   Procedure Laterality Date      ABDOMEN SURGERY      Caesarean section     ENDOSCOPIC ULTRASOUND UPPER GASTROINTESTINAL TRACT (GI) N/A 2024    Procedure: ENDOSCOPIC ULTRASOUND, ESOPHAGOSCOPY / UPPER GASTROINTESTINAL TRACT (GI), Fine needle aspiration;  Surgeon: Terell Michael MD;  Location: [facility] OR     GYN SURGERY  10/88    Removal of bilateral ovarian tumors (benign)     HERNIA REPAIR  approx. 1963    Umbilical hernia     SURGICAL HISTORY OF -   1988    dermoid tumor bilateral ovaries     SURGICAL HISTORY OF -       c/section            Social History:  Social History     Tobacco Use     Smoking status: Never     Passive exposure: Never     Smokeless tobacco: Never   Vaping Use     Vaping status: Never Used   Substance Use Topics     Alcohol use: Yes     Alcohol/week: 1.0 - 2.0 standard drink of alcohol     Types: 1 Standard drinks or equivalent per week     Comment: One or two drinks per month     Drug use: No            Family History:  Family History   Problem Relation Age of Onset     Anesthesia Reaction Mother         Difficulty waking. Was paranoid     Lymphoma Mother      Thyroid Disease Mother      Other Cancer Mother          of B-cell lymphoma     Obesity Mother      Cancer Mother          of B-cell lymphoma     Hypertension Father      Myocardial Infarction Father          of myocardial infarction     Diabetes Brother      Hypertension Brother      Depression Brother      Osteoporosis Paternal Grandmother      Obesity Paternal Grandmother      Heart Disease Paternal Grandmother      Diabetes Maternal Aunt      Lymphoma Maternal Aunt      Thyroid Cancer Cousin      Colon Cancer Maternal Uncle      Breast Cancer Paternal Aunt      Obesity Maternal Grandmother      Diabetes Other         Maternal aunt     Other Cancer Other         Maternal aunt;  of B-cell lymphoma     Obesity Other      Cancer Other         Maternal aunt,  of B-cell lymphoma     Diabetes Brother      Hypertension Brother       Depression Brother      Obesity Brother      Other Cancer Other         Maternal uncle;  of colon cancer     Colon Cancer Other         Maternal uncle,  of colon cancer     Other Cancer Other         Paternal aunt;  of breast cancer     Cancer Other         Paternal aunt,  of breast cancer     Other Cancer Cousin         Malgorzata Royal's daughter;  of thyroid cancer     Obesity Cousin      Thyroid Disease Other         Paternal aunt; Graves's Disease     Bleeding Disorder No family hx of      Clotting Disorder No family hx of             Allergies:  Allergies   Allergen Reactions     Sulfa Antibiotics      Mother and brother allergic, she has not tried it            Medications:  Current Outpatient Medications   Medication Sig Dispense Refill     acetaminophen (TYLENOL) 500 MG tablet Take 1,000 mg by mouth as needed for mild pain       blood glucose (NO BRAND SPECIFIED) test strip Use to test blood sugar 2 times daily or as directed. 100 strip 0     blood glucose monitoring (NO BRAND SPECIFIED) meter device kit Use to test blood sugar 2 times daily or as directed. 1 kit 0     Cholecalciferol (VITAMIN D PO) Take 1 tablet by mouth every evening       famotidine (PEPCID) 40 MG tablet Take 1 tablet (40 mg) by mouth daily 90 tablet 0     ibuprofen (ADVIL/MOTRIN) 200 MG tablet Take 200-400 mg by mouth as needed for pain       l-lysine HCl 500 MG TABS tablet Take 500 mg by mouth nightly as needed       levothyroxine (SYNTHROID/LEVOTHROID) 200 MCG tablet Take 200 mcg by mouth daily       metFORMIN (GLUCOPHAGE) 500 MG tablet Take 1 tablet (500 mg) by mouth 2 times daily (with meals) 180 tablet 1     ZOLOFT 50 MG OR TABS Take 50 mg by mouth every evening       No current facility-administered medications for this visit.       Physical Exam            Physical Exam:  B/P: Data Unavailable, T: Data Unavailable, P: Data Unavailable, R: Data Unavailable  Estimated body mass index is 39.21 kg/m  as calculated from  "the following:    Height as of 8/7/24: 1.753 m (5' 9\").    Weight as of 8/7/24: 120.4 kg (265 lb 8 oz).  General Appearance Adult: Alert, no acute distress, oriented      Again, thank you for allowing me to participate in the care of your patient.      Sincerely,    Sinan Travis MD    "

## 2024-08-14 ENCOUNTER — HOSPITAL ENCOUNTER (OUTPATIENT)
Facility: AMBULATORY SURGERY CENTER | Age: 64
End: 2024-08-14
Attending: UROLOGY
Payer: COMMERCIAL

## 2024-08-14 ENCOUNTER — TELEPHONE (OUTPATIENT)
Dept: UROLOGY | Facility: CLINIC | Age: 64
End: 2024-08-14
Payer: COMMERCIAL

## 2024-08-14 PROBLEM — N20.1 RIGHT URETERAL STONE: Status: ACTIVE | Noted: 2024-08-13

## 2024-08-14 NOTE — TELEPHONE ENCOUNTER
Spoke with: Patient       Date of surgery: Friday August 30 2024 with Dr Ortiz      Location: [facility]      Informed patient they will need a adult : YES      Pre op with provider: Patient will schedule through  [facility]       H&P Scheduled in PAC- NA         Pre procedure covid :Not req      Additional imaging: NA        Surgery Packet :Sent via [employer]      Additional comments: Please call for surgery teaching

## 2024-08-14 NOTE — TELEPHONE ENCOUNTER
RECORDS STATUS - ALL OTHER DIAGNOSIS      RECORDS RECEIVED FROM: [facility]   NOTES STATUS DETAILS   OFFICE NOTE from referring provider Epic 7/24/24: Dr. Osbaldo Cervantes   OPERATIVE REPORT [facility] 7/19/24: EUS    MEDICATION LIST [facility]    LABS     PATHOLOGY REPORTS Report in [facility] 7/19/24: [id number]    ANYTHING RELATED TO DIAGNOSIS Epic Most recent 8/7/24   IMAGING (NEED IMAGES & REPORT)     CT SCANS PACS 7/7/24: CT Abd Pel    PET PACS 8/12/24: PET Eyes to Thighs

## 2024-08-15 ENCOUNTER — MYC MEDICAL ADVICE (OUTPATIENT)
Dept: UROLOGY | Facility: CLINIC | Age: 64
End: 2024-08-15
Payer: COMMERCIAL

## 2024-08-15 DIAGNOSIS — N20.1 URETERAL STONE: Primary | ICD-10-CM

## 2024-08-16 ENCOUNTER — PRE VISIT (OUTPATIENT)
Dept: SURGERY | Facility: HOSPITAL | Age: 64
End: 2024-08-16
Payer: COMMERCIAL

## 2024-08-16 ENCOUNTER — LAB (OUTPATIENT)
Dept: LAB | Facility: HOSPITAL | Age: 64
End: 2024-08-16
Attending: SURGERY
Payer: COMMERCIAL

## 2024-08-16 ENCOUNTER — ONCOLOGY VISIT (OUTPATIENT)
Dept: SURGERY | Facility: HOSPITAL | Age: 64
End: 2024-08-16
Attending: SURGERY
Payer: COMMERCIAL

## 2024-08-16 VITALS
TEMPERATURE: 97.8 F | HEART RATE: 67 BPM | SYSTOLIC BLOOD PRESSURE: 137 MMHG | HEIGHT: 69 IN | OXYGEN SATURATION: 95 % | WEIGHT: 263.1 LBS | BODY MASS INDEX: 38.97 KG/M2 | RESPIRATION RATE: 16 BRPM | DIASTOLIC BLOOD PRESSURE: 69 MMHG

## 2024-08-16 DIAGNOSIS — K86.89 PANCREATIC MASS: ICD-10-CM

## 2024-08-16 DIAGNOSIS — D3A.8 NEUROENDOCRINE TUMOR OF PANCREAS (H): ICD-10-CM

## 2024-08-16 DIAGNOSIS — N20.1 URETERAL STONE: ICD-10-CM

## 2024-08-16 PROCEDURE — 99213 OFFICE O/P EST LOW 20 MIN: CPT | Performed by: SURGERY

## 2024-08-16 PROCEDURE — 82365 CALCULUS SPECTROSCOPY: CPT

## 2024-08-16 PROCEDURE — 99205 OFFICE O/P NEW HI 60 MIN: CPT | Performed by: SURGERY

## 2024-08-16 ASSESSMENT — PAIN SCALES - GENERAL: PAINLEVEL: NO PAIN (0)

## 2024-08-16 NOTE — PROGRESS NOTES
"Oncology Rooming Note    August 16, 2024 1:24 PM   Ingrid Oakes is a 64 year old female who presents for:    Chief Complaint   Patient presents with    Oncology Clinic Visit     Initial consult, review PET of 8/12, related to Neuroendocrine tumor of pancreas.     Initial Vitals: /69 (BP Location: Left arm, Patient Position: Sitting, Cuff Size: Adult Large)   Pulse 67   Temp 97.8  F (36.6  C) (Oral)   Resp 16   Ht 1.753 m (5' 9\")   Wt 119.3 kg (263 lb 1.6 oz)   LMP  (LMP Unknown)   SpO2 95%   BMI 38.85 kg/m   Estimated body mass index is 38.85 kg/m  as calculated from the following:    Height as of this encounter: 1.753 m (5' 9\").    Weight as of this encounter: 119.3 kg (263 lb 1.6 oz). Body surface area is 2.41 meters squared.  No Pain (0) Comment: Data Unavailable   No LMP recorded (lmp unknown). Patient is postmenopausal.  Allergies reviewed: Yes  Medications reviewed: Yes    Medications: Medication refills not needed today.  Pharmacy name entered into Vaxart:    [facility][id number] - [place], [place] - [address]  [id number] - [place], [place] - [address] AT [facility] [address]    Frailty Screening:   Is the patient here for a new oncology consult visit in cancer care? 2. No      Clinical concerns: Initial consult.       Anne Marie Maxwell CMA              "

## 2024-08-16 NOTE — LETTER
8/16/2024      Ingrid Oakes  [address] [place] [facility] [address]      Dear Colleague,    Thank you for referring your patient, Ingrid Oakes, to the [facility] [place]. Please see a copy of my visit note below.      [facility]  [address] [facility] [address]  Phone: [phone]  Fax: [phone]  Department of Surgery  Division of Surgical Oncology    New Patient Consultation    Patient:  Ingrid Oakes, Date of birth 1960  Date of Visit:  08/06/2024  Referring Provider Osbaldo Cervantes      Assessment & Plan     Ingrid Oakes is a 64 year old female with well-differentiated neuroendocrine tumor of the pancreas    The natural history of pancreatic neuroendocrine tumors was discussed with the patient and accompanying family members and/or other guests.    Pancreatic neuroendocrine tumors (pNETs) can be a complex topic. pNETs are abnormal growths that are not benign - they can grow and metastasize - but they are distinct from invasive cancers. Well-differentiated NETs do not evolve/transform into cancers and should not be thought as pre-cancerous or cancerous. In fact, these tend to be quite indolent and low-risk. Retrospective and recent prospective studies have demonstrated that, if we just observe small low-grade NETs, the majority do not grow and, even if they do grow, are extremely unlikely to metastasize. As a result, we routinely recommend surveillance for any low-grade pNETs under 2cm. Other risk factors like growth or tumor grade may push us towards surgery. These tumors can secrete hormones and surgery would be appropriate in these cases; based on this patient's history, I do not suspect a functional pNET.    If surgery were ever recommended, the surgery necessary for this NET would be a distal pancreatectomy. We briefly went through the steps of that procedure, expected recovery, and the risk of complications,  which almost certainly outweigh the risks of leaving this tumor in place.    Therefore, I am recommending ongoing surveillance, which can be done with a combination of cross-sectional imaging (DOTATATE PET, arterial-contrast phase of a CT, or MRI) or endoscopic ultrasounds. I do not recommend blood tests, as they have been shown to be unreliable surveillance tests.    I am aware that the patient has some hypodensities seen on the CT scan from July.  These are too small to characterize but are not the same enhancement as her neuroendocrine tumors on the relevant phases of contrast so these seem unlikely to be concerning for metastatic sites.  These were not seen on her dotatate scan.    All questions were answered to their apparent satisfaction, and contact information was provided should additional questions or concerns arise.    Plan Summary:  -Recommend surveillance for low-grade sub-2 cm pancreatic neuroendocrine tumor        Stef Go MD MPHS        Medical Decision Making   Today's visit was centered around a new tumor diagnosis the risk of additional morbidity or mortality with or without further treatment is moderate. Total time spent was 60 minutes, including but not limited to patient-facing time, chart review, review of tests/studies as noted above, and care coordination.             History of Present Illness    Pertinent history obtained from: chart review and patient    CT done for R flank pain showed a 2mm right distal ureter stone and associated mild hydronephrosis, hepatomegaly, and an enhancing 1.2cm pancreatic tail mass.    EUS was done on 7/19/24 by Dr. Michael. He idenfitied a round 16mm mass in the tail. Pathology was positive for neuroendocrine tumor, Ki 67 <1%.    DOTATATE PET scan was done on 8/12, and showed no evidence of extrapancreatic spread    She denies shortness of breath, wheezing, flushing, diarrhea, or palpitations.  She has no family history of parathyroid pituitary or  "pancreatic pathologies.    Past Medical History, Past Surgical History, and Family History reviewed - see appropriate tabs in EMR  Data  Laboratory data and imaging listed below were reviewed as part of this encounter.     Labs:  Labs reviewed from July include chromogranin, CEA, CA 19-9    Imaging:  CT scan from 7 7 and dotatate PET from 812 reviewed    EUS report from July 19, 2024 reviewed                 Oncology Rooming Note    August 16, 2024 1:24 PM   Ingrid Oakes is a 64 year old female who presents for:    Chief Complaint   Patient presents with     Oncology Clinic Visit     Initial consult, review PET of 8/12, related to Neuroendocrine tumor of pancreas.     Initial Vitals: /69 (BP Location: Left arm, Patient Position: Sitting, Cuff Size: Adult Large)   Pulse 67   Temp 97.8  F (36.6  C) (Oral)   Resp 16   Ht 1.753 m (5' 9\")   Wt 119.3 kg (263 lb 1.6 oz)   LMP  (LMP Unknown)   SpO2 95%   BMI 38.85 kg/m   Estimated body mass index is 38.85 kg/m  as calculated from the following:    Height as of this encounter: 1.753 m (5' 9\").    Weight as of this encounter: 119.3 kg (263 lb 1.6 oz). Body surface area is 2.41 meters squared.  No Pain (0) Comment: Data Unavailable   No LMP recorded (lmp unknown). Patient is postmenopausal.  Allergies reviewed: Yes  Medications reviewed: Yes    Medications: Medication refills not needed today.  Pharmacy name entered into TrendPo:    [facility] #[id number] - [place], [place] - [address]  [id number] - [place], [place] - [address] AT [facility] [address]    Frailty Screening:   Is the patient here for a new oncology consult visit in cancer care? 2. No      Clinical concerns: Initial consult.       Anne Marie Maxwell CMA                Again, thank you for allowing me to participate in the care of your patient.        Sincerely,        Stef Go MD  "

## 2024-08-20 PROBLEM — K86.89 PANCREATIC MASS: Status: ACTIVE | Noted: 2024-08-20

## 2024-08-20 LAB
APPEARANCE STONE: NORMAL
COMPN STONE: NORMAL
SPECIMEN WT: 16 MG

## 2024-08-20 NOTE — PROGRESS NOTES
[facility] Hematology and Oncology progress note    Patient: Ingrid Oakes  MRN: [id number]  Date of Service: Aug 21, 2024           Reason for consultation      Problem List Items Addressed This Visit          Digestive    Neuroendocrine tumor of pancreas (H28) - Primary    Pancreatic mass         Assessment / number of problems addressed      1.  Pancreatic neuroendocrine tumor  2.  Newly diagnosed diabetes  3.  Slightly overweight    Plan and medical decision making      Presenting with neuroendocrine tumor of the pancreas.Reviewed notes from each unique source.  Reviewed each unique test.    Ordered tests.    Independently interpreted lab tests and radiological exams performed by other physicians.  Personally reviewed the images of the dotatate PET scan.  Independent historian account obtained from her .    Discussed the management of low-grade neuroendocrine tumor of the pancreas.  She is only seen and met with Dr. Nelson.  I completely agree with him that observation is very viable  option for her condition.  It will depend how she feels on the long-term basis.  Her surveillance will be with the help of endoscopic ultrasound.  The other option is CT scan.  The endoscopic ultrasound although slightly invasive as a better look in my opinion.  We did talk about tumor markers or chromogranin A level.  These are not the most reliable indicators.  However consistent upward trend can definitely prompt further investigation.  Talk about good diet and exercise.  She needs to get her BMI lower.  Follow-up in 6 months.    Clinical/pathological stage       Cancer Staging   No matching staging information was found for the patient.      History of present illness      Ms. Ingrid Oakes is a 64 year old woman who was diagnosed with 15 mm sized pancreatic neuroendocrine tumor.  This presented incidentally when she had a CT scan done looking for kidney stone in June 2024.  That showed a lesion in her distal  pancreas.  She was then referred to Dr. Michael at [facility].  Dr. Michael did EUS and biopsy which confirmed low-grade neuroendocrine tumor.    The patient has been recently diagnosed with diabetes.  Her hemoglobin A1c was over 9.  Her blood sugars have been running high.    She was referred to Dr. Stef Nelson.  Dr. Nelson evaluated her at recommended surveillance.  She had a dotatate PET scan.  She is coming here after that.    Detailed review of systems      A detailed review of systems was obtained.  Positive findings noted in the history.  Rest of the review of system is otherwise negative.      Past medical/surgical/social/family history        Past Medical History:   Diagnosis Date    Abnormal finding on imaging     Cancer (H) Suspected 2024    Depressive disorder     Have taken sertraline since diagnosis    Hashimoto's thyroiditis     Hernia, abdominal     Umbilical hernia repaired in     Irritable bowel syndrome     Mild recurrent major depression (H24)     Mumps 's    Nephrolithiasis     RENE (obstructive sleep apnea)     Palpitations 's    Excess caffeine brings on palpitations    SVT (supraventricular tachycardia) (H24)     Caffeine induced or as a young adult    Type 2 diabetes mellitus without complication (H)      Past Surgical History:   Procedure Laterality Date     SECTION      ENDOSCOPIC ULTRASOUND UPPER GASTROINTESTINAL TRACT (GI) N/A 2024    Procedure: ENDOSCOPIC ULTRASOUND, ESOPHAGOSCOPY / UPPER GASTROINTESTINAL TRACT (GI), Fine needle aspiration;  Surgeon: Terell Michael MD;  Location: [facility] OR    GYN SURGERY  10/88    Removal of bilateral ovarian tumors (benign)    HERNIA REPAIR      Umbilical hernia    SURGICAL HISTORY OF -   1988    dermoid tumor bilateral ovaries     Family History   Problem Relation Age of Onset    Anesthesia Reaction Mother         Difficulty waking. Was paranoid    Lymphoma Mother     Thyroid Disease  Mother     Other Cancer Mother          of B-cell lymphoma    Obesity Mother     Cancer Mother          of B-cell lymphoma    Hypertension Father     Myocardial Infarction Father          of myocardial infarction    Diabetes Brother     Hypertension Brother     Depression Brother     Diabetes Brother     Hypertension Brother     Depression Brother     Obesity Brother     Obesity Maternal Grandmother     Osteoporosis Paternal Grandmother     Obesity Paternal Grandmother     Heart Disease Paternal Grandmother     Diabetes Maternal Aunt     Lymphoma Maternal Aunt     Graves' disease Maternal Aunt     Colon Cancer Maternal Uncle     Breast Cancer Paternal Aunt     Thyroid Cancer Cousin     Other Cancer Cousin         Malgorzata Royal's daughter;  of thyroid cancer    Obesity Cousin     Diabetes Other         Maternal aunt    Other Cancer Other         Maternal aunt;  of B-cell lymphoma    Obesity Other     Cancer Other         Maternal aunt,  of B-cell lymphoma    Other Cancer Other         Maternal uncle;  of colon cancer    Colon Cancer Other         Maternal uncle,  of colon cancer    Other Cancer Other         Paternal aunt;  of breast cancer    Cancer Other         Paternal aunt,  of breast cancer    Thyroid Disease Other         Paternal aunt; Graves's Disease    Bleeding Disorder No family hx of     Clotting Disorder No family hx of      Social History     Socioeconomic History    Marital status:      Spouse name: None    Number of children: None    Years of education: None    Highest education level: None   Tobacco Use    Smoking status: Never     Passive exposure: Never   Vaping Use    Vaping status: Never Used   Substance and Sexual Activity    Alcohol use: Yes     Alcohol/week: 1.0 - 2.0 standard drink of alcohol     Types: 1 Standard drinks or equivalent per week     Comment: per month    Drug use: No    Sexual activity: Yes     Partners: Male           Allergies     "  Allergies   Allergen Reactions    Sulfa Antibiotics      Mother and brother allergic, she has not tried it         Physical exam        BP (!) 146/67   Pulse 76   Temp 97.9  F (36.6  C)   Resp 18   Ht 1.753 m (5' 9\")   Wt 118.5 kg (261 lb 4.8 oz)   LMP  (LMP Unknown)   SpO2 96%   BMI 38.59 kg/m      GENERAL: no acute distress. Cooperative in conversation.   HEENT: pupils are equal, round and reactive. Oral mucosa is moist and intact.  RESP:Chest symmetric. Regular respiratory rate. No stridor.  ABD: Nondistended, soft.  EXTREMITIES: No lower extremity edema.   NEURO: non focal. Alert and oriented x3.   PSYCH: within normal limits. No depression or anxiety.  SKIN: warm dry intact       Laboratory data      Recent Results (from the past 168 hour(s))   Stone analysis   Result Value Ref Range    Stone Mass 16 mg    Calculi Description See Note     Stone Composition See Note        Imaging results        PET Dotatate Eyes to Thighs    Result Date: 8/12/2024  EXAM: PET DOTATATE EYES TO THIGHS LOCATION: [facility] DATE: 8/12/2024 INDICATION: Initial treatment planning and staging for other malignant neuroendocrine tumors. COMPARISON: CT the abdomen pelvis dated 7/7/2024 CONTRAST: None TECHNIQUE: 4.2 mCi Cu 64 dotatate was administered intravenously to the patient. One hour post intravenous administration, PET imaging was performed from the skull vertex to mid thigh, utilizing attenuation correction with concurrent axial CT and PET/CT image fusion. Dose reduction techniques were used. PET/CT FINDINGS: Somatostatin receptor positive lesion in the pancreatic tail measuring approximately 1.2 cm suspicious for biopsy-proven neuroendocrine neoplasm without metastasis. CT FINDINGS: Mild senescent intracranial changes. Mild paranasal sinus mucosal thickening. No significant coronary artery calcium. The lungs are clear. Redemonstrated 2 mm calculus at the distal right ureter/ureterovesicular " junction, not significant changed since 7/7/2024. Left parapelvic renal cysts. Pelvic phleboliths. Multilevel degenerative changes of the spine.     IMPRESSION: Findings suspicious for pancreatic tail neuroendocrine neoplasm without metastasis.         This note has been dictated using voice recognition software. Any grammatical or context distortions are unintentional and inherent to the software    Total time spent was over 45 minutes.  This includes chart prep time, review of clinical data including notes from outside physicians, labs, review of medical imaging, discussion about  plan of care, documentation and .    Signed by: Osbaldo Cervantes MD

## 2024-08-21 ENCOUNTER — MYC MEDICAL ADVICE (OUTPATIENT)
Dept: FAMILY MEDICINE | Facility: CLINIC | Age: 64
End: 2024-08-21
Payer: COMMERCIAL

## 2024-08-21 ENCOUNTER — ONCOLOGY VISIT (OUTPATIENT)
Dept: ONCOLOGY | Facility: HOSPITAL | Age: 64
End: 2024-08-21
Payer: COMMERCIAL

## 2024-08-21 VITALS
RESPIRATION RATE: 18 BRPM | HEIGHT: 69 IN | HEART RATE: 76 BPM | BODY MASS INDEX: 38.7 KG/M2 | WEIGHT: 261.3 LBS | DIASTOLIC BLOOD PRESSURE: 67 MMHG | SYSTOLIC BLOOD PRESSURE: 146 MMHG | OXYGEN SATURATION: 96 % | TEMPERATURE: 97.9 F

## 2024-08-21 DIAGNOSIS — D3A.8 NEUROENDOCRINE TUMOR OF PANCREAS (H): Primary | ICD-10-CM

## 2024-08-21 DIAGNOSIS — K86.89 PANCREATIC MASS: ICD-10-CM

## 2024-08-21 DIAGNOSIS — E11.9 TYPE 2 DIABETES MELLITUS WITHOUT COMPLICATION, WITHOUT LONG-TERM CURRENT USE OF INSULIN (H): Primary | ICD-10-CM

## 2024-08-21 PROCEDURE — 99214 OFFICE O/P EST MOD 30 MIN: CPT | Performed by: INTERNAL MEDICINE

## 2024-08-21 PROCEDURE — G2211 COMPLEX E/M VISIT ADD ON: HCPCS | Performed by: INTERNAL MEDICINE

## 2024-08-21 PROCEDURE — 99215 OFFICE O/P EST HI 40 MIN: CPT | Performed by: INTERNAL MEDICINE

## 2024-08-21 ASSESSMENT — PAIN SCALES - GENERAL: PAINLEVEL: NO PAIN (0)

## 2024-08-21 NOTE — LETTER
8/21/2024      Ingrid Oakes  [address] [facility] [address]      Dear Colleague,    Thank you for referring your patient, Ingrid Oakes, to the [facility] [place]. Please see a copy of my visit note below.    [facility] Hematology and Oncology progress note    Patient: Ingrid Oakes  MRN: [id number]  Date of Service: Aug 21, 2024           Reason for consultation      Problem List Items Addressed This Visit          Digestive    Neuroendocrine tumor of pancreas (H28) - Primary    Pancreatic mass         Assessment / number of problems addressed      1.  Pancreatic neuroendocrine tumor  2.  Newly diagnosed diabetes  3.  Slightly overweight    Plan and medical decision making      Presenting with neuroendocrine tumor of the pancreas.Reviewed notes from each unique source.  Reviewed each unique test.    Ordered tests.    Independently interpreted lab tests and radiological exams performed by other physicians.  Personally reviewed the images of the dotatate PET scan.  Independent historian account obtained from her .    Discussed the management of low-grade neuroendocrine tumor of the pancreas.  She is only seen and met with Dr. Nelson.  I completely agree with him that observation is very viable  option for her condition.  It will depend how she feels on the long-term basis.  Her surveillance will be with the help of endoscopic ultrasound.  The other option is CT scan.  The endoscopic ultrasound although slightly invasive as a better look in my opinion.  We did talk about tumor markers or chromogranin A level.  These are not the most reliable indicators.  However consistent upward trend can definitely prompt further investigation.  Talk about good diet and exercise.  She needs to get her BMI lower.  Follow-up in 6 months.    Clinical/pathological stage       Cancer Staging   No matching staging information was found for the patient.      History of present illness       Ms. Ingrid Oakes is a 64 year old woman who was diagnosed with 15 mm sized pancreatic neuroendocrine tumor.  This presented incidentally when she had a CT scan done looking for kidney stone in 2024.  That showed a lesion in her distal pancreas.  She was then referred to Dr. Michael at [facility].  Dr. Michael did EUS and biopsy which confirmed low-grade neuroendocrine tumor.    The patient has been recently diagnosed with diabetes.  Her hemoglobin A1c was over 9.  Her blood sugars have been running high.    She was referred to Dr. Stef Nelson.  Dr. Nelson evaluated her at recommended surveillance.  She had a dotatate PET scan.  She is coming here after that.    Detailed review of systems      A detailed review of systems was obtained.  Positive findings noted in the history.  Rest of the review of system is otherwise negative.      Past medical/surgical/social/family history        Past Medical History:   Diagnosis Date     Abnormal finding on imaging      Cancer (H) Suspected 2024     Depressive disorder     Have taken sertraline since diagnosis     Hashimoto's thyroiditis      Hernia, abdominal     Umbilical hernia repaired in      Irritable bowel syndrome      Mild recurrent major depression (H24)      Mumps 's     Nephrolithiasis      RENE (obstructive sleep apnea)      Palpitations 's    Excess caffeine brings on palpitations     SVT (supraventricular tachycardia) (H24)     Caffeine induced or as a young adult     Type 2 diabetes mellitus without complication (H)      Past Surgical History:   Procedure Laterality Date      SECTION       ENDOSCOPIC ULTRASOUND UPPER GASTROINTESTINAL TRACT (GI) N/A 2024    Procedure: ENDOSCOPIC ULTRASOUND, ESOPHAGOSCOPY / UPPER GASTROINTESTINAL TRACT (GI), Fine needle aspiration;  Surgeon: Terell Michael MD;  Location: [facility] OR     GYN SURGERY  10/88    Removal of bilateral ovarian tumors (benign)     HERNIA REPAIR   1963    Umbilical hernia     SURGICAL HISTORY OF -   1988    dermoid tumor bilateral ovaries     Family History   Problem Relation Age of Onset     Anesthesia Reaction Mother         Difficulty waking. Was paranoid     Lymphoma Mother      Thyroid Disease Mother      Other Cancer Mother          of B-cell lymphoma     Obesity Mother      Cancer Mother          of B-cell lymphoma     Hypertension Father      Myocardial Infarction Father          of myocardial infarction     Diabetes Brother      Hypertension Brother      Depression Brother      Diabetes Brother      Hypertension Brother      Depression Brother      Obesity Brother      Obesity Maternal Grandmother      Osteoporosis Paternal Grandmother      Obesity Paternal Grandmother      Heart Disease Paternal Grandmother      Diabetes Maternal Aunt      Lymphoma Maternal Aunt      Graves' disease Maternal Aunt      Colon Cancer Maternal Uncle      Breast Cancer Paternal Aunt      Thyroid Cancer Cousin      Other Cancer Cousin         Malgorzata Royal's daughter;  of thyroid cancer     Obesity Cousin      Diabetes Other         Maternal aunt     Other Cancer Other         Maternal aunt;  of B-cell lymphoma     Obesity Other      Cancer Other         Maternal aunt,  of B-cell lymphoma     Other Cancer Other         Maternal uncle;  of colon cancer     Colon Cancer Other         Maternal uncle,  of colon cancer     Other Cancer Other         Paternal aunt;  of breast cancer     Cancer Other         Paternal aunt,  of breast cancer     Thyroid Disease Other         Paternal aunt; Graves's Disease     Bleeding Disorder No family hx of      Clotting Disorder No family hx of      Social History     Socioeconomic History     Marital status:      Spouse name: None     Number of children: None     Years of education: None     Highest education level: None   Tobacco Use     Smoking status: Never     Passive exposure: Never  "  Vaping Use     Vaping status: Never Used   Substance and Sexual Activity     Alcohol use: Yes     Alcohol/week: 1.0 - 2.0 standard drink of alcohol     Types: 1 Standard drinks or equivalent per week     Comment: per month     Drug use: No     Sexual activity: Yes     Partners: Male           Allergies      Allergies   Allergen Reactions     Sulfa Antibiotics      Mother and brother allergic, she has not tried it         Physical exam        BP (!) 146/67   Pulse 76   Temp 97.9  F (36.6  C)   Resp 18   Ht 1.753 m (5' 9\")   Wt 118.5 kg (261 lb 4.8 oz)   LMP  (LMP Unknown)   SpO2 96%   BMI 38.59 kg/m      GENERAL: no acute distress. Cooperative in conversation.   HEENT: pupils are equal, round and reactive. Oral mucosa is moist and intact.  RESP:Chest symmetric. Regular respiratory rate. No stridor.  ABD: Nondistended, soft.  EXTREMITIES: No lower extremity edema.   NEURO: non focal. Alert and oriented x3.   PSYCH: within normal limits. No depression or anxiety.  SKIN: warm dry intact       Laboratory data      Recent Results (from the past 168 hour(s))   Stone analysis   Result Value Ref Range    Stone Mass 16 mg    Calculi Description See Note     Stone Composition See Note        Imaging results        PET Dotatate Eyes to Thighs    Result Date: 8/12/2024  EXAM: PET DOTATATE EYES TO THIGHS LOCATION: [facility] DATE: 8/12/2024 INDICATION: Initial treatment planning and staging for other malignant neuroendocrine tumors. COMPARISON: CT the abdomen pelvis dated 7/7/2024 CONTRAST: None TECHNIQUE: 4.2 mCi Cu 64 dotatate was administered intravenously to the patient. One hour post intravenous administration, PET imaging was performed from the skull vertex to mid thigh, utilizing attenuation correction with concurrent axial CT and PET/CT image fusion. Dose reduction techniques were used. PET/CT FINDINGS: Somatostatin receptor positive lesion in the pancreatic tail measuring approximately " "1.2 cm suspicious for biopsy-proven neuroendocrine neoplasm without metastasis. CT FINDINGS: Mild senescent intracranial changes. Mild paranasal sinus mucosal thickening. No significant coronary artery calcium. The lungs are clear. Redemonstrated 2 mm calculus at the distal right ureter/ureterovesicular junction, not significant changed since 7/7/2024. Left parapelvic renal cysts. Pelvic phleboliths. Multilevel degenerative changes of the spine.     IMPRESSION: Findings suspicious for pancreatic tail neuroendocrine neoplasm without metastasis.         This note has been dictated using voice recognition software. Any grammatical or context distortions are unintentional and inherent to the software    Total time spent was over 45 minutes.  This includes chart prep time, review of clinical data including notes from outside physicians, labs, review of medical imaging, discussion about  plan of care, documentation and .    Signed by: Osbaldo Cervantes MD      Oncology Rooming Note    August 21, 2024 3:10 PM   Ingrid Oakes is a 64 year old female who presents for:    Chief Complaint   Patient presents with     Oncology Clinic Visit     Neuroendocrine tumor of pancreas (H28)     Initial Vitals: BP (!) 146/67   Pulse 76   Temp 97.9  F (36.6  C)   Resp 18   Ht 1.753 m (5' 9\")   Wt 118.5 kg (261 lb 4.8 oz)   LMP  (LMP Unknown)   SpO2 96%   BMI 38.59 kg/m   Estimated body mass index is 38.59 kg/m  as calculated from the following:    Height as of this encounter: 1.753 m (5' 9\").    Weight as of this encounter: 118.5 kg (261 lb 4.8 oz). Body surface area is 2.4 meters squared.  No Pain (0) Comment: Data Unavailable   No LMP recorded (lmp unknown). Patient is postmenopausal.  Allergies reviewed: Yes  Medications reviewed: Yes    Medications: Medication refills not needed today.  Pharmacy name entered into [facility]:    [id number] - [place], [place] - [address]  [facility] #[id number] [address] AT [facility] [address]    Frailty Screening:   Is the patient here for a new oncology consult visit in cancer care? 2. No      Clinical concerns: None      Анна Mock LPN                 Again, thank you for allowing me to participate in the care of your patient.        Sincerely,        Osbaldo Cervantes MD

## 2024-08-21 NOTE — PROGRESS NOTES
"Oncology Rooming Note    August 21, 2024 3:10 PM   Ingrid Oakes is a 64 year old female who presents for:    Chief Complaint   Patient presents with    Oncology Clinic Visit     Neuroendocrine tumor of pancreas (H28)     Initial Vitals: BP (!) 146/67   Pulse 76   Temp 97.9  F (36.6  C)   Resp 18   Ht 1.753 m (5' 9\")   Wt 118.5 kg (261 lb 4.8 oz)   LMP  (LMP Unknown)   SpO2 96%   BMI 38.59 kg/m   Estimated body mass index is 38.59 kg/m  as calculated from the following:    Height as of this encounter: 1.753 m (5' 9\").    Weight as of this encounter: 118.5 kg (261 lb 4.8 oz). Body surface area is 2.4 meters squared.  No Pain (0) Comment: Data Unavailable   No LMP recorded (lmp unknown). Patient is postmenopausal.  Allergies reviewed: Yes  Medications reviewed: Yes    Medications: Medication refills not needed today.  Pharmacy name entered into [facility]:    [facility] #[id number] - [place], [place] - [address]  [id number] - [place], [place] - [address] AT [facility] [address]    Frailty Screening:   Is the patient here for a new oncology consult visit in cancer care? 2. No      Clinical concerns: None      Анна Mock LPN             "

## 2024-08-24 ENCOUNTER — HEALTH MAINTENANCE LETTER (OUTPATIENT)
Age: 64
End: 2024-08-24

## 2024-09-17 ENCOUNTER — PATIENT OUTREACH (OUTPATIENT)
Dept: ONCOLOGY | Facility: HOSPITAL | Age: 64
End: 2024-09-17
Payer: COMMERCIAL

## 2024-09-17 DIAGNOSIS — D3A.8 NEUROENDOCRINE TUMOR OF PANCREAS (H): Primary | ICD-10-CM

## 2024-09-17 NOTE — PROGRESS NOTES
[facility]: Cancer Care                                                                                            Situation: Patient chart reviewed by care coordinator.    Background: Patient with a newly diagnosed pancreatic neuroendocrine tumor as well as newly diagnosed diabetes.    Assessment: Patient was in the clinic to see Dr Cervantes on 8/21/2024.  She is already seen and met with Dr. Go.    Dr Cervantes has been in contact with Dr. Go and they have determined that a dotatate PET scan will be the best form of surveillance for her.    Plan/Recommendations: Call placed to patient today to give her this updated information.  Dotatate PET scan has been ordered but I let her know that this cannot be scheduled until about a month before she sees the provider.  I let her know that she can get her 6-month follow-up scheduled with Dr Cervantes in March and we will call her closer to that time to make sure the dotatate PET scan gets scheduled.  She verbalized understanding and was very appreciative for the call and update.    Signature:  Mari Ramirez RN

## 2024-09-17 NOTE — PROGRESS NOTES
Care planning note    After discussion with Dr. Go, decided to do the surveillance with dotatate PET scan.  Order placed.  Will follow-up after that.    Osbaldo Cervantes MD

## 2024-11-02 ENCOUNTER — HEALTH MAINTENANCE LETTER (OUTPATIENT)
Age: 64
End: 2024-11-02

## 2024-11-08 ENCOUNTER — OFFICE VISIT (OUTPATIENT)
Dept: FAMILY MEDICINE | Facility: CLINIC | Age: 64
End: 2024-11-08
Payer: COMMERCIAL

## 2024-11-08 VITALS
HEART RATE: 65 BPM | TEMPERATURE: 97.7 F | WEIGHT: 249.3 LBS | BODY MASS INDEX: 36.92 KG/M2 | DIASTOLIC BLOOD PRESSURE: 74 MMHG | OXYGEN SATURATION: 98 % | SYSTOLIC BLOOD PRESSURE: 142 MMHG | RESPIRATION RATE: 18 BRPM | HEIGHT: 69 IN

## 2024-11-08 DIAGNOSIS — E11.9 TYPE 2 DIABETES MELLITUS WITHOUT COMPLICATION, WITHOUT LONG-TERM CURRENT USE OF INSULIN (H): Primary | ICD-10-CM

## 2024-11-08 DIAGNOSIS — E03.9 HYPOTHYROIDISM, UNSPECIFIED TYPE: ICD-10-CM

## 2024-11-08 DIAGNOSIS — Z23 ENCOUNTER FOR VACCINATION: ICD-10-CM

## 2024-11-08 LAB
EST. AVERAGE GLUCOSE BLD GHB EST-MCNC: 140 MG/DL
HBA1C MFR BLD: 6.5 % (ref 0–5.6)

## 2024-11-08 PROCEDURE — 84443 ASSAY THYROID STIM HORMONE: CPT | Performed by: FAMILY MEDICINE

## 2024-11-08 PROCEDURE — 36415 COLL VENOUS BLD VENIPUNCTURE: CPT | Performed by: FAMILY MEDICINE

## 2024-11-08 PROCEDURE — 91320 SARSCV2 VAC 30MCG TRS-SUC IM: CPT | Performed by: FAMILY MEDICINE

## 2024-11-08 PROCEDURE — 90471 IMMUNIZATION ADMIN: CPT | Performed by: FAMILY MEDICINE

## 2024-11-08 PROCEDURE — 90480 ADMN SARSCOV2 VAC 1/ONLY CMP: CPT | Performed by: FAMILY MEDICINE

## 2024-11-08 PROCEDURE — 83036 HEMOGLOBIN GLYCOSYLATED A1C: CPT | Performed by: FAMILY MEDICINE

## 2024-11-08 PROCEDURE — 99214 OFFICE O/P EST MOD 30 MIN: CPT | Mod: 25 | Performed by: FAMILY MEDICINE

## 2024-11-08 PROCEDURE — 84439 ASSAY OF FREE THYROXINE: CPT | Performed by: FAMILY MEDICINE

## 2024-11-08 PROCEDURE — 90673 RIV3 VACCINE NO PRESERV IM: CPT | Performed by: FAMILY MEDICINE

## 2024-11-08 ASSESSMENT — PAIN SCALES - GENERAL: PAINLEVEL_OUTOF10: MILD PAIN (3)

## 2024-11-08 NOTE — PROGRESS NOTES
Assessment & Plan     Type 2 diabetes mellitus without complication, without long-term current use of insulin (H)  Chronic, improved and stable. Patient with excellent blood sugar control on recent A1c. Recommend continuing current therapy. Reasonable to try a CGM for several days/ continuously given she has high readings in the AM and is trying several lifestyle changes.   - HEMOGLOBIN A1C  - REVIEW OF HEALTH MAINTENANCE PROTOCOL ORDERS  - metFORMIN (GLUCOPHAGE) 500 MG tablet  Dispense: 180 tablet; Refill: 1  - Adult Diabetes Education  Referral  - PRIMARY CARE FOLLOW-UP SCHEDULING  - HEMOGLOBIN A1C    Hypothyroidism, unspecified type  Chronic, due for labs today.   - TSH WITH FREE T4 REFLEX  - PRIMARY CARE FOLLOW-UP SCHEDULING  - TSH WITH FREE T4 REFLEX    Encounter for vaccination  - INFLUENZA VACCINE TRIVALENT(FLUBLOK)  - COVID-19 12+ (PFIZER)              See Patient Instructions    Subjective   Ingrid is a 64 year old, presenting for the following health issues:  Follow Up (3 months follow-up from 08/07/2024: Diabetes. /Patient states she would like to know if she can use a glucose monitor (temporarily) at night. Patient states she wants to know what is going on at night? )        11/8/2024     3:20 PM   Additional Questions   Roomed by Jake ASIF MA     History of Present Illness       Diabetes:   She presents for follow up of diabetes.  She is checking home blood glucose two times daily.   She checks blood glucose before and after meals.  Blood glucose is never over 200 and never under 70. She is aware of hypoglycemia symptoms including shakiness.    She has no concerns regarding her diabetes at this time.   She is not experiencing numbness or burning in feet, excessive thirst, blurry vision, weight changes or redness, sores or blisters on feet.           She eats 2-3 servings of fruits and vegetables daily.She consumes 0 sweetened beverage(s) daily.She exercises with enough effort to increase her  heart rate 30 to 60 minutes per day.  She exercises with enough effort to increase her heart rate 5 days per week.   She is taking medications regularly.       Diabetes Follow-up    How often are you checking your blood sugar? Two times daily  Blood sugar testing frequency justification:  Patient modifying lifestyle changes (diet, exercise) with blood sugars  What time of day are you checking your blood sugars (select all that apply)?  Before meals and After meals  Have you had any blood sugars above 200?  No  Have you had any blood sugars below 70?  No  What symptoms do you notice when your blood sugar is low?  Shaky  What concerns do you have today about your diabetes? None   Do you have any of these symptoms? (Select all that apply)  No numbness or tingling in feet.  No redness, sores or blisters on feet.  No complaints of excessive thirst.  No reports of blurry vision.  No significant changes to weight.      BP Readings from Last 2 Encounters:   11/08/24 (!) 142/74   08/21/24 (!) 146/67     Hemoglobin A1C (%)   Date Value   07/07/2024 8.7 (H)     LDL Cholesterol Calculated (mg/dL)   Date Value   08/07/2024 76       How many servings of fruits and vegetables do you eat daily?  2-3  On average, how many sweetened beverages do you drink each day (Examples: soda, juice, sweet tea, etc.  Do NOT count diet or artificially sweetened beverages)?   0  How many days per week do you exercise enough to make your heart beat faster? 5  How many minutes a day do you exercise enough to make your heart beat faster? 30 - 60  How many days per week do you miss taking your medication? 0    Knee pain- sudden pain from sleeping    Review of Systems  Constitutional, HEENT, cardiovascular, pulmonary, gi and gu systems are negative, except as otherwise noted.      Objective    BP (!) 142/74 (BP Location: Right arm, Patient Position: Sitting, Cuff Size: Adult Regular)   Pulse 65   Temp 97.7  F (36.5  C) (Oral)   Resp 18   Ht 1.753 m  "(5' 9\")   Wt 113.1 kg (249 lb 4.8 oz)   LMP  (LMP Unknown)   SpO2 98%   BMI 36.82 kg/m    Body mass index is 36.82 kg/m .  Physical Exam   GENERAL: alert and no distress  EYES: Eyes grossly normal to inspection, PERRL and conjunctivae and sclerae normal  NECK: no adenopathy, no asymmetry, masses, or scars  MS: no gross musculoskeletal defects noted, no edema  SKIN: no suspicious lesions or rashes  Diabetic foot exam: normal DP and PT pulses, no trophic changes or ulcerative lesions, and normal sensory exam    Results for orders placed or performed in visit on 11/08/24 (from the past 24 hours)   HEMOGLOBIN A1C   Result Value Ref Range    Estimated Average Glucose 140 (H) <117 mg/dL    Hemoglobin A1C 6.5 (H) 0.0 - 5.6 %           Signed Electronically by: WENDI CAMERON DO    "

## 2024-11-09 LAB
T4 FREE SERPL-MCNC: 1.72 NG/DL (ref 0.9–1.7)
TSH SERPL DL<=0.005 MIU/L-ACNC: 0.04 UIU/ML (ref 0.3–4.2)

## 2024-11-12 ENCOUNTER — TELEPHONE (OUTPATIENT)
Dept: FAMILY MEDICINE | Facility: CLINIC | Age: 64
End: 2024-11-12
Payer: COMMERCIAL

## 2024-11-12 DIAGNOSIS — E03.9 HYPOTHYROIDISM, UNSPECIFIED TYPE: Primary | ICD-10-CM

## 2024-11-12 DIAGNOSIS — E11.9 TYPE 2 DIABETES MELLITUS WITHOUT COMPLICATION, WITHOUT LONG-TERM CURRENT USE OF INSULIN (H): ICD-10-CM

## 2024-11-12 NOTE — TELEPHONE ENCOUNTER
Attempted to call pt, phone was answered but nobody was on the other end.  Will attempt to call back at a later time  Results were sent to [facility] as well

## 2024-11-12 NOTE — RESULT ENCOUNTER NOTE
Attempted to reach patient for:  Routine follow up.     Outcome:  Voice message left.     Next Follow Up:  9/19/22.   Please let Ingrid know:  A1c improved- no changes in plan  TSH low at 0.04. Likely needs less thyroid hormone overall due to age and weight changes. Decrease to 175 mcg. If she agrees, please forward back to me for ordering and adding a tsh for 8 weeks now.

## 2024-11-12 NOTE — TELEPHONE ENCOUNTER
----- Message from WENDI CAMERON sent at 11/12/2024 12:54 PM CST -----  Please let [place] know:  A1c improved- no changes in plan  TSH low at 0.04. Likely needs less thyroid hormone overall due to age and weight changes. Decrease to 175 mcg. If she agrees, please forward back to me for ordering and adding a tsh for 8 weeks now.

## 2024-11-14 NOTE — TELEPHONE ENCOUNTER
Contacted patient who has been able [facility]  She is willing to start the 175 mcg dosing, explains she needs it to be the brand name as that is always what she has taken in the past.        Also requesting aaron sensors, her blood glucose has been higher in the mornings and she would like to get better access to her glucose trends.  Discussed insurance may be issue, best to send in for sensors only and add nerissa to her smartphone to use.  Patient agrees and is very confident she can use nerissa with sensors.

## 2024-11-15 RX ORDER — HYDROCHLOROTHIAZIDE 12.5 MG/1
CAPSULE ORAL
Qty: 2 EACH | Refills: 0 | Status: SHIPPED | OUTPATIENT
Start: 2024-11-15

## 2024-11-15 RX ORDER — LEVOTHYROXINE SODIUM 175 MCG
175 TABLET ORAL DAILY
Qty: 90 TABLET | Refills: 1 | Status: SHIPPED | OUTPATIENT
Start: 2024-11-15

## 2024-12-13 ENCOUNTER — ALLIED HEALTH/NURSE VISIT (OUTPATIENT)
Dept: EDUCATION SERVICES | Facility: CLINIC | Age: 64
End: 2024-12-13
Payer: COMMERCIAL

## 2024-12-13 DIAGNOSIS — E11.9 DIABETES MELLITUS, TYPE 2 (H): Primary | ICD-10-CM

## 2024-12-13 RX ORDER — ACYCLOVIR 400 MG/1
TABLET ORAL
Qty: 9 EACH | Refills: 5 | Status: SHIPPED | OUTPATIENT
Start: 2024-12-13

## 2024-12-13 RX ORDER — METFORMIN HYDROCHLORIDE 500 MG/1
1000 TABLET, EXTENDED RELEASE ORAL
Qty: 180 TABLET | Refills: 1 | Status: SHIPPED | OUTPATIENT
Start: 2024-12-13

## 2024-12-13 NOTE — LETTER
"    12/13/2024         RE: Ingrid Oakes  [address] [facility] [address]        Dear Colleague,    Thank you for referring your patient, Ingrid Oakes, to the [facility]. Please see a copy of my visit note below.    Diabetes Self-Management Education & Support    Presents for:      Type of Service: In Person Visit      ASSESSMENT:    Patient seen today for DM education. She was diagnosed earlier this year, A1c last month down to goal. She is taking metformin 500 mg BID but notes sometimes she forgets the evening dose. Discussed changing to XR and taking 1000 mg in the morning. She states this would be helpful. Pt notes her brother has been diabetic for over 30 years and she has been researching things online.     She is doing well with diet. We reviewed cho foods, portions, goals, label reading and healthy eating. Pt notes the sensor has been really helpful.     She is using aaron 3 nerissa, connected to clinic. She notes she paid $75 for 2 sensors. Per liaison team, Dexcom should be covered at $0. Will change to Dexcom. Pt has 5 days left on current sensor then will change over to Dexcom. Advised on installing Dexcom G7 nerissa today and demonstrated how to insert G7 sensor. Her insurance is changing in the new year, if she has any trouble obtaining CGM she will let CDE know.     Discussed activity and benefits. 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 She just got a treadmill as well. She is aiming for 6K steps/day. Encouraged pt to continue to work on increasing.     REPORTS:             Patient's most recent   Lab Results   Component Value Date    A1C 6.5 11/08/2024     is meeting goal of <7.0    Diabetes knowledge and skills assessment:   Patient is knowledgeable in diabetes management concepts related to: Healthy Eating, Being Active, Monitoring, Taking Medication, Problem Solving, Reducing Risks, and Healthy Coping    Continue education " "with the following diabetes management concepts: all as needed     Based on learning assessment above, most appropriate setting for further diabetes education would be: Individual setting.      PLAN    Will change Metformin to XR - take 1,000 mg per day in the morning     Continue to be mindful of diet and exercise.     Will switch to G7.     Follow-up: will follow up with CDE PRN     See Care Plan for co-developed, patient-state behavior change goals.  AVS provided for patient today.    Education Materials Provided:  Carbohydrate Counting and label reading       SUBJECTIVE/OBJECTIVE:  Diabetes education in the past 24mo: No  Diabetes type: Type 2  Disease course: Improving  How confident are you filling out medical forms by yourself:: Quite a bit  Diabetes management related comments/concerns: Want to get better at controlling blood glucose spikes and drops  Cultural Influences/Ethnic Background:  Not  or       Diabetes Symptoms & Complications:  Diabetes Related Symptoms: Visual change  Weight trend: Decreasing  Symptom course: Improving  Disease course: Improving       Patient Problem List and Family Medical History reviewed for relevant medical history, current medical status, and diabetes risk factors.    Vitals:  LMP  (LMP Unknown)   Estimated body mass index is 36.82 kg/m  as calculated from the following:    Height as of 11/8/24: 1.753 m (5' 9\").    Weight as of 11/8/24: 113.1 kg (249 lb 4.8 oz).   Last 3 BP:   BP Readings from Last 3 Encounters:   11/08/24 (!) 142/74   08/21/24 (!) 146/67   08/16/24 137/69       History   Smoking Status     Never   Smokeless Tobacco     Never       Labs:  Lab Results   Component Value Date    A1C 6.5 11/08/2024     Lab Results   Component Value Date     07/19/2024     Lab Results   Component Value Date    LDL 76 08/07/2024     Direct Measure HDL   Date Value Ref Range Status   08/07/2024 41 (L) >=50 mg/dL Final   ]  GFR Estimate   Date Value Ref Range " "Status   07/07/2024 58 (L) >60 mL/min/1.73m2 Final     Comment:     eGFR calculated using 2021 CKD-EPI equation.     No results found for: \"GFRESTBLACK\"  Lab Results   Component Value Date    CR 1.07 07/07/2024     No results found for: \"MICROALBUMIN\"    Healthy Eating:  Healthy Eating Assessed Today: Yes  Cultural/Confucianism diet restrictions?: No  How many times a week on average do you eat food made away from home (restaurant/take-out)?: 1  Meals include: Breakfast, Lunch, Dinner, Morning Snack, Evening Snack  Beverages: Water, Tea, Coffee, Diet soda    Being Active:  Barrier to exercise: Time    Monitoring:  Monitoring Assessed Today: Yes  Blood Glucose Meter: Accu-chek, CGM  Times checking blood sugar at home (number): 2  Times checking blood sugar at home (per): Day      Taking Medications:  Diabetes Medication(s)       Biguanides       metFORMIN (GLUCOPHAGE XR) 500 MG 24 hr tablet Take 2 tablets (1,000 mg) by mouth daily (with breakfast).     metFORMIN (GLUCOPHAGE) 500 MG tablet Take 1 tablet (500 mg) by mouth 2 times daily (with meals).            Current Treatments: Diet, Oral Medication (taken by mouth)    Problem Solving:                 Reducing Risks:  Has dilated eye exam at least once a year?: Yes  Sees dentist every 6 months?: Yes  Feet checked by healthcare provider in the last year?: Yes    Healthy Coping:  Healthy Coping Assessed Today: Yes  Emotional response to diabetes: Ready to learn  Informal Support system:: Family, Partner  Stage of change: ACTION (Actively working towards change)  Patient Activation Measure Survey Score:       No data to display                  Care Plan and Education Provided:  Healthy Eating: Balanced meals, Carbohydrate Counting, Consistency in amount and timing of carbohydrate intake, Label reading, Plate planning method, and Portion control, Being Active: Amount recommended (150 minutes moderate or 75 minutes vigorous activity and 2-3 days strength training per week), " Finding a physical activity routine that works for you, and Relationship of activity to glucose, Monitoring: Blood glucose versus Continuous Glucose Monitoring, Frequency of monitoring, Individual glucose targets, Log and interpret results, and Purpose, Taking Medication: Side effects of prescribed medication(s) and When to take medication(s), Problem Solving: High glucose - causes, signs/symptoms, treatment and prevention, Low glucose - causes, signs/symptoms, treatment and prevention, Rule of 15 and carrying a carbohydrate source at all times in case of low glucose, and When to call a health care provider, Reducing Risks: Complications of diabetes and Goal for A1c, how it relates to glucose and how often to check, and Healthy Coping: Benefits of making appropriate lifestyle changes and Recognize feelings about diagnosis      Time Spent: 60 minutes  Encounter Type: Individual    Any diabetes medication dose changes were made via the CDE Protocol per the patient's referring provider. A copy of this encounter was shared with the provider.    Answers submitted by the patient for this visit:  Questionnaire about: Diabetes problem (Submitted on 12/10/2024)  Chief Complaint: Diabetes problem

## 2024-12-13 NOTE — PROGRESS NOTES
"Diabetes Self-Management Education & Support    Presents for:      Type of Service: In Person Visit      ASSESSMENT:    Patient seen today for DM education. She was diagnosed earlier this year, A1c last month down to goal. She is taking metformin 500 mg BID but notes sometimes she forgets the evening dose. Discussed changing to XR and taking 1000 mg in the morning. She states this would be helpful. Pt notes her brother has been diabetic for over 30 years and she has been researching things online.     She is doing well with diet. We reviewed cho foods, portions, goals, label reading and healthy eating. Pt notes the sensor has been really helpful.     She is using Dealised 3 nerissa, connected to clinic. She notes she paid $75 for 2 sensors. Per liaison team, Dexcom should be covered at $0. Will change to Dexcom. Pt has 5 days left on current sensor then will change over to Dexcom. Advised on installing Dexcom G7 nerissa today and demonstrated how to insert G7 sensor. Her insurance is changing in the new year, if she has any trouble obtaining CGM she will let CDE know.     Discussed activity and benefits. 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 She just got a treadmill as well. She is aiming for 6K steps/day. Encouraged pt to continue to work on increasing.     REPORTS:             Patient's most recent   Lab Results   Component Value Date    A1C 6.5 11/08/2024     is meeting goal of <7.0    Diabetes knowledge and skills assessment:   Patient is knowledgeable in diabetes management concepts related to: Healthy Eating, Being Active, Monitoring, Taking Medication, Problem Solving, Reducing Risks, and Healthy Coping    Continue education with the following diabetes management concepts: all as needed     Based on learning assessment above, most appropriate setting for further diabetes education would be: Individual setting.      PLAN    Will change Metformin to XR - take 1,000 mg per day " "in the morning     Continue to be mindful of diet and exercise.     Will switch to G7.     Follow-up: will follow up with CDE PRN     See Care Plan for co-developed, patient-state behavior change goals.  AVS provided for patient today.    Education Materials Provided:  Carbohydrate Counting and label reading       SUBJECTIVE/OBJECTIVE:  Diabetes education in the past 24mo: No  Diabetes type: Type 2  Disease course: Improving  How confident are you filling out medical forms by yourself:: Quite a bit  Diabetes management related comments/concerns: Want to get better at controlling blood glucose spikes and drops  Cultural Influences/Ethnic Background:  Not  or       Diabetes Symptoms & Complications:  Diabetes Related Symptoms: Visual change  Weight trend: Decreasing  Symptom course: Improving  Disease course: Improving       Patient Problem List and Family Medical History reviewed for relevant medical history, current medical status, and diabetes risk factors.    Vitals:  LMP  (LMP Unknown)   Estimated body mass index is 36.82 kg/m  as calculated from the following:    Height as of 11/8/24: 1.753 m (5' 9\").    Weight as of 11/8/24: 113.1 kg (249 lb 4.8 oz).   Last 3 BP:   BP Readings from Last 3 Encounters:   11/08/24 (!) 142/74   08/21/24 (!) 146/67   08/16/24 137/69       History   Smoking Status    Never   Smokeless Tobacco    Never       Labs:  Lab Results   Component Value Date    A1C 6.5 11/08/2024     Lab Results   Component Value Date     07/19/2024     Lab Results   Component Value Date    LDL 76 08/07/2024     Direct Measure HDL   Date Value Ref Range Status   08/07/2024 41 (L) >=50 mg/dL Final   ]  GFR Estimate   Date Value Ref Range Status   07/07/2024 58 (L) >60 mL/min/1.73m2 Final     Comment:     eGFR calculated using 2021 CKD-EPI equation.     No results found for: \"GFRESTBLACK\"  Lab Results   Component Value Date    CR 1.07 07/07/2024     No results found for: " "\"MICROALBUMIN\"    Healthy Eating:  Healthy Eating Assessed Today: Yes  Cultural/Tenriism diet restrictions?: No  How many times a week on average do you eat food made away from home (restaurant/take-out)?: 1  Meals include: Breakfast, Lunch, Dinner, Morning Snack, Evening Snack  Beverages: Water, Tea, Coffee, Diet soda    Being Active:  Barrier to exercise: Time    Monitoring:  Monitoring Assessed Today: Yes  Blood Glucose Meter: Accu-chek, CGM  Times checking blood sugar at home (number): 2  Times checking blood sugar at home (per): Day      Taking Medications:  Diabetes Medication(s)       Biguanides       metFORMIN (GLUCOPHAGE XR) 500 MG 24 hr tablet Take 2 tablets (1,000 mg) by mouth daily (with breakfast).     metFORMIN (GLUCOPHAGE) 500 MG tablet Take 1 tablet (500 mg) by mouth 2 times daily (with meals).            Current Treatments: Diet, Oral Medication (taken by mouth)    Problem Solving:                 Reducing Risks:  Has dilated eye exam at least once a year?: Yes  Sees dentist every 6 months?: Yes  Feet checked by healthcare provider in the last year?: Yes    Healthy Coping:  Healthy Coping Assessed Today: Yes  Emotional response to diabetes: Ready to learn  Informal Support system:: Family, Partner  Stage of change: ACTION (Actively working towards change)  Patient Activation Measure Survey Score:       No data to display                  Care Plan and Education Provided:  Healthy Eating: Balanced meals, Carbohydrate Counting, Consistency in amount and timing of carbohydrate intake, Label reading, Plate planning method, and Portion control, Being Active: Amount recommended (150 minutes moderate or 75 minutes vigorous activity and 2-3 days strength training per week), Finding a physical activity routine that works for you, and Relationship of activity to glucose, Monitoring: Blood glucose versus Continuous Glucose Monitoring, Frequency of monitoring, Individual glucose targets, Log and interpret " results, and Purpose, Taking Medication: Side effects of prescribed medication(s) and When to take medication(s), Problem Solving: High glucose - causes, signs/symptoms, treatment and prevention, Low glucose - causes, signs/symptoms, treatment and prevention, Rule of 15 and carrying a carbohydrate source at all times in case of low glucose, and When to call a health care provider, Reducing Risks: Complications of diabetes and Goal for A1c, how it relates to glucose and how often to check, and Healthy Coping: Benefits of making appropriate lifestyle changes and Recognize feelings about diagnosis      Time Spent: 60 minutes  Encounter Type: Individual    Any diabetes medication dose changes were made via the CDE Protocol per the patient's referring provider. A copy of this encounter was shared with the provider.    Answers submitted by the patient for this visit:  Questionnaire about: Diabetes problem (Submitted on 12/10/2024)  Chief Complaint: Diabetes problem

## 2025-03-01 ENCOUNTER — HEALTH MAINTENANCE LETTER (OUTPATIENT)
Age: 65
End: 2025-03-01

## 2025-03-06 ENCOUNTER — MYC MEDICAL ADVICE (OUTPATIENT)
Dept: FAMILY MEDICINE | Facility: CLINIC | Age: 65
End: 2025-03-06
Payer: COMMERCIAL

## 2025-03-06 DIAGNOSIS — E11.9 TYPE 2 DIABETES MELLITUS WITHOUT COMPLICATION, WITHOUT LONG-TERM CURRENT USE OF INSULIN (H): Primary | ICD-10-CM

## 2025-03-10 ENCOUNTER — HOSPITAL ENCOUNTER (OUTPATIENT)
Dept: PET IMAGING | Facility: HOSPITAL | Age: 65
Discharge: HOME | End: 2025-03-10
Attending: INTERNAL MEDICINE | Admitting: INTERNAL MEDICINE
Payer: COMMERCIAL

## 2025-03-10 DIAGNOSIS — D3A.8 NEUROENDOCRINE TUMOR OF PANCREAS (H): ICD-10-CM

## 2025-03-10 PROCEDURE — 250N000011 HC RX IP 250 OP 636: Mod: JZ | Performed by: INTERNAL MEDICINE

## 2025-03-10 PROCEDURE — A9592 HC RX IP 250 OP 636: HCPCS | Mod: JZ | Performed by: INTERNAL MEDICINE

## 2025-03-10 PROCEDURE — 78815 PET IMAGE W/CT SKULL-THIGH: CPT | Mod: PS

## 2025-03-10 RX ADMIN — COPPER CU 64 DOTATATE 4.38 MILLICURIE: 1 INJECTION, SOLUTION INTRAVENOUS at 06:23

## 2025-03-12 ENCOUNTER — TELEPHONE (OUTPATIENT)
Dept: ONCOLOGY | Facility: HOSPITAL | Age: 65
End: 2025-03-12
Payer: COMMERCIAL

## 2025-03-12 NOTE — TELEPHONE ENCOUNTER
Called and spoke with pt to r/s apt on 03/13/25 with Dr. Cervantes due to provider being out of clinic. Pt was r/s to 03/13/25 with Wendi Hanley CNP virtually.

## 2025-03-13 ENCOUNTER — VIRTUAL VISIT (OUTPATIENT)
Dept: ONCOLOGY | Facility: HOSPITAL | Age: 65
End: 2025-03-13
Attending: INTERNAL MEDICINE
Payer: COMMERCIAL

## 2025-03-13 VITALS — WEIGHT: 250 LBS | HEIGHT: 69 IN | BODY MASS INDEX: 37.03 KG/M2

## 2025-03-13 DIAGNOSIS — D3A.8 NEUROENDOCRINE TUMOR OF PANCREAS (H): Primary | ICD-10-CM

## 2025-03-13 ASSESSMENT — PAIN SCALES - GENERAL: PAINLEVEL_OUTOF10: NO PAIN (0)

## 2025-03-13 NOTE — NURSING NOTE
Current patient location: [address] [place] [id number]    Is the patient currently in the state of [place]? YES    Visit mode: VIDEO    If the visit is dropped, the patient can be reconnected by:VIDEO VISIT: Send to e-mail at: [email or web address]    Will anyone else be joining the visit? NO  (If patient encounters technical issues they should call [phone]    Are changes needed to the allergy or medication list? No    Are refills needed on medications prescribed by this physician? NO    Rooming Documentation:  Questionnaire complete    Reason for visit: MARYANN IRELAND

## 2025-03-13 NOTE — PROGRESS NOTES
[facility] Hematology and Oncology Progress Note    Patient: Ingrid Oakes  MRN: [id number]  Date of Service: Mar 13, 2025          Reason for Visit    Chief Complaint   Patient presents with    RECHECK       Assessment and Plan     Cancer Staging   No matching staging information was found for the patient.    Pancreatic neuroendocrine tumor, distal pancreas: Low-grade neuroendocrine tumor.  Biopsy was done July 2024.  She is currently on observation.  She had a recent dotatate PET scan and that does show 1.6 cm tumor that is stable from 6 months ago.  There is radiotracer activity which is expected.  Signs of metastatic disease or any worsening of the disease so stable.  She will continue observation.  I told her we will likely do scans every 6 months for a while and then eventually if things are quite stable we may just do that once a year.  She will return in 6 months to see Dr. Cervantes.  She will get the scan and then see Dr. Cervantes virtually per her request.  I Did personally review PET scan images    ECOG Performance    0 - Independent    Distress Screening (within last 30 days)    No data recorded     Pain  Pain Score: No Pain (0)    Problem List    Patient Active Problem List   Diagnosis    Hypothyroidism    Obesity    Mild major depression (H)    IBS (irritable bowel syndrome)    CARDIOVASCULAR SCREENING; LDL GOAL LESS THAN 160    Neuroendocrine tumor of pancreas (H)    Diabetes mellitus, type 2 (H)    Class 2 severe obesity due to excess calories with serious comorbidity in adult (H)    Right ureteral stone    Pancreatic mass        ______________________________________________________________________________    History of Present Illness    Oncologist: Dr. Cervantes    Diagnosis: Pancreatic neuroendocrine tumor.    Treatment: Observation    Interim history: Patient is scheduled today for a follow-up visit.  This is done virtually per her request.  She had a PET scan done a couple of days ago and  "would like to review that.  Overall she states that she is been feeling good.  Denies any weight loss.  Denies any abdominal pain issues.  She states that she has been diagnosed with diabetes so she is been working on trying to get that under control.  She denies any appetite changes.    Review of Systems    Pertinent items are noted in HPI.    Past History    Past Medical History:   Diagnosis Date    Abnormal finding on imaging     Cancer (H) Suspected 7/2024    Depressive disorder 1999    Have taken sertraline since diagnosis    Hashimoto's thyroiditis     Hernia, abdominal 1962    Umbilical hernia repaired in 1963    Irritable bowel syndrome     Mild recurrent major depression     Mumps 1970's    Nephrolithiasis     RENE (obstructive sleep apnea)     Palpitations 2000's    Excess caffeine brings on palpitations    SVT (supraventricular tachycardia)     Caffeine induced or as a young adult    Type 2 diabetes mellitus without complication (H)        PHYSICAL EXAM  Ht 1.753 m (5' 9\")   Wt 113.4 kg (250 lb)   LMP  (LMP Unknown)   BMI 36.92 kg/m      GENERAL: no acute distress. Cooperative in conversation. Alone on video  RESP: Regular respiratory rate. No expiratory wheezes   NEURO: non focal. Alert and oriented x3.   PSYCH: within normal limits. No depression or anxiety.  SKIN: exposed skin is dry intact.     Lab Results    No results found for this or any previous visit (from the past week).    Imaging    PET Dotatate Eyes to Thighs    Result Date: 3/10/2025  EXAM: PET DOTATATE EYES TO THIGHS LOCATION: [facility] DATE: 3/10/2025 INDICATION: Subsequent treatment planning and restaging for other malignant neuroendocrine tumors. On surveillance. Pancreatic tail neuroendocrine tumor. COMPARISON: DOTATATE PET/CT 08/12/2024. CONTRAST: None. TECHNIQUE: 4.38 mCi Cu 64 dotatate was administered intravenously to the patient. One hour post intravenous administration, PET imaging was performed from " the skull vertex to mid thigh, utilizing attenuation correction with concurrent axial CT and PET/CT  image fusion. Dose reduction techniques were used. PET/CT FINDINGS: Unchanged markedly radiotracer avid 1.6 x 1.3 cm biopsy-proven neuroendocrine tumor of the pancreatic tail. No evidence of radiotracer avid metastatic disease. CT FINDINGS: Mild intracranial senescent changes. Small left maxillary mucous retention cyst. Trace pericardial effusion. Few subcentimeter hypoattenuating hepatic lesions are too small to characterize but unchanged. Left renal sinus cysts, not requiring  follow-up. Polyarticular degenerative changes including multilevel degenerative changes of the spine.     IMPRESSION: Stable radiotracer avid pancreatic tail neuroendocrine tumor. No evidence of radiotracer avid metastatic disease.      Virtual Visit Details    Type of service:  Video Visit   Start time: 10:07  End time: 10:13    Originating Location (pt. Location): Home    Distant Location (provider location):  On-site  Platform used for Video Visit: Tao    Signed by: TYRONE Barajas CNP   3

## 2025-03-19 ENCOUNTER — PATIENT OUTREACH (OUTPATIENT)
Dept: ONCOLOGY | Facility: HOSPITAL | Age: 65
End: 2025-03-19
Payer: COMMERCIAL

## 2025-03-19 NOTE — PROGRESS NOTES
"[facility]: Cancer Care  Chart Check                                                                                            Situation: Patient chart reviewed by care coordinator.    Background: Patient with a diagnosis of pancreatic neuroendocrine tumor as well as diabetes.     Assessment: Patient was seen virtually by Wendi Hanley CNP in Dr Cervantes's absence.  Patient had a dotatate PET scan prior.  The PET results were reviewed with the patient who is currently on observation.  Overall this is stable.  She will continue on observation.  We will likely do scans every 6 months for a while and then eventually if things are quite stable, may push this out to once a year.  At this point she should return in 6 months to see Dr Cervantes after having had a dotatate PET scan prior.  She would also like to see Dr Cervantes virtually per her request.    Plan/Recommendations: Patient's \"checkout appointment request\" has been deferred until 6/1/2025.  I will continue to follow to make sure patient gets scheduled appropriately.    Signature:  Mari Ramirez RN    "

## 2025-04-24 ENCOUNTER — PATIENT OUTREACH (OUTPATIENT)
Dept: CARE COORDINATION | Facility: CLINIC | Age: 65
End: 2025-04-24
Payer: COMMERCIAL

## 2025-05-27 DIAGNOSIS — E03.9 HYPOTHYROIDISM, UNSPECIFIED TYPE: ICD-10-CM

## 2025-05-27 DIAGNOSIS — E11.9 DIABETES MELLITUS, TYPE 2 (H): ICD-10-CM

## 2025-05-27 RX ORDER — LEVOTHYROXINE SODIUM 175 MCG
175 TABLET ORAL DAILY
Qty: 90 TABLET | Refills: 1 | Status: SHIPPED | OUTPATIENT
Start: 2025-05-27

## 2025-05-27 RX ORDER — METFORMIN HYDROCHLORIDE 500 MG/1
1000 TABLET, EXTENDED RELEASE ORAL
Qty: 180 TABLET | Refills: 1 | Status: SHIPPED | OUTPATIENT
Start: 2025-05-27

## 2025-05-27 RX ORDER — ACYCLOVIR 400 MG/1
TABLET ORAL
Qty: 9 EACH | Refills: 5 | Status: SHIPPED | OUTPATIENT
Start: 2025-05-27

## 2025-05-27 NOTE — TELEPHONE ENCOUNTER
FAX [facility] Refill Request 05/27/25 8:15 AM    Next Visit with PCP = 05/30/25  Last Visit with PCP = 11/08/24    SYNTHROID 175 MCG tablet   metFORMIN (GLUCOPHAGE XR) 500 MG 24 hr tablet   Continuous Glucose Sensor (DEXCOM G7 SENSOR) MISC

## 2025-06-08 SDOH — HEALTH STABILITY: PHYSICAL HEALTH: ON AVERAGE, HOW MANY MINUTES DO YOU ENGAGE IN EXERCISE AT THIS LEVEL?: 20 MIN

## 2025-06-08 SDOH — HEALTH STABILITY: PHYSICAL HEALTH: ON AVERAGE, HOW MANY DAYS PER WEEK DO YOU ENGAGE IN MODERATE TO STRENUOUS EXERCISE (LIKE A BRISK WALK)?: 3 DAYS

## 2025-06-08 ASSESSMENT — SOCIAL DETERMINANTS OF HEALTH (SDOH): HOW OFTEN DO YOU GET TOGETHER WITH FRIENDS OR RELATIVES?: ONCE A WEEK

## 2025-06-12 ENCOUNTER — OFFICE VISIT (OUTPATIENT)
Dept: FAMILY MEDICINE | Facility: CLINIC | Age: 65
End: 2025-06-12
Payer: COMMERCIAL

## 2025-06-12 VITALS
SYSTOLIC BLOOD PRESSURE: 147 MMHG | HEIGHT: 69 IN | TEMPERATURE: 98.3 F | RESPIRATION RATE: 20 BRPM | OXYGEN SATURATION: 99 % | DIASTOLIC BLOOD PRESSURE: 75 MMHG | HEART RATE: 71 BPM | BODY MASS INDEX: 37.77 KG/M2 | WEIGHT: 255 LBS

## 2025-06-12 DIAGNOSIS — E11.9 TYPE 2 DIABETES MELLITUS WITHOUT COMPLICATION, WITHOUT LONG-TERM CURRENT USE OF INSULIN (H): ICD-10-CM

## 2025-06-12 DIAGNOSIS — Z00.00 ROUTINE GENERAL MEDICAL EXAMINATION AT A HEALTH CARE FACILITY: Primary | ICD-10-CM

## 2025-06-12 DIAGNOSIS — Z11.59 NEED FOR HEPATITIS C SCREENING TEST: ICD-10-CM

## 2025-06-12 DIAGNOSIS — R03.0 ELEVATED BP WITHOUT DIAGNOSIS OF HYPERTENSION: ICD-10-CM

## 2025-06-12 DIAGNOSIS — E03.9 HYPOTHYROIDISM, UNSPECIFIED TYPE: ICD-10-CM

## 2025-06-12 DIAGNOSIS — Z01.84 IMMUNITY STATUS TESTING: ICD-10-CM

## 2025-06-12 LAB
ALBUMIN SERPL BCG-MCNC: 4.3 G/DL (ref 3.5–5.2)
ALP SERPL-CCNC: 64 U/L (ref 40–150)
ALT SERPL W P-5'-P-CCNC: 15 U/L (ref 0–50)
ANION GAP SERPL CALCULATED.3IONS-SCNC: 11 MMOL/L (ref 7–15)
AST SERPL W P-5'-P-CCNC: 16 U/L (ref 0–45)
BILIRUB SERPL-MCNC: 0.4 MG/DL
BUN SERPL-MCNC: 15.2 MG/DL (ref 8–23)
CALCIUM SERPL-MCNC: 9.8 MG/DL (ref 8.8–10.4)
CHLORIDE SERPL-SCNC: 106 MMOL/L (ref 98–107)
CREAT SERPL-MCNC: 0.9 MG/DL (ref 0.51–0.95)
EGFRCR SERPLBLD CKD-EPI 2021: 71 ML/MIN/1.73M2
EST. AVERAGE GLUCOSE BLD GHB EST-MCNC: 140 MG/DL
GLUCOSE SERPL-MCNC: 116 MG/DL (ref 70–99)
HBA1C MFR BLD: 6.5 % (ref 0–5.6)
HBV SURFACE AB SERPL IA-ACNC: <3.5 M[IU]/ML
HBV SURFACE AB SERPL IA-ACNC: NONREACTIVE M[IU]/ML
HCO3 SERPL-SCNC: 22 MMOL/L (ref 22–29)
HCV AB SERPL QL IA: NONREACTIVE
LDLC SERPL DIRECT ASSAY-MCNC: 115 MG/DL
POTASSIUM SERPL-SCNC: 4.4 MMOL/L (ref 3.4–5.3)
PROT SERPL-MCNC: 6.9 G/DL (ref 6.4–8.3)
SODIUM SERPL-SCNC: 139 MMOL/L (ref 135–145)
TSH SERPL DL<=0.005 MIU/L-ACNC: 1.4 UIU/ML (ref 0.3–4.2)

## 2025-06-12 NOTE — PATIENT INSTRUCTIONS
Patient Education   Preventive Care Advice   This is general advice given by our system to help you stay healthy. However, your care team may have specific advice just for you. Please talk to your care team about your preventive care needs.  Nutrition  Eat 5 or more servings of fruits and vegetables each day.  Try wheat bread, brown rice and whole grain pasta (instead of white bread, rice, and pasta).  Get enough calcium and vitamin D. Check the label on foods and aim for 100% of the RDA (recommended daily allowance).  Lifestyle  Exercise at least 150 minutes each week  (30 minutes a day, 5 days a week).  Do muscle strengthening activities 2 days a week. These help control your weight and prevent disease.  No smoking.  Wear sunscreen to prevent skin cancer.  Have a dental exam and cleaning every 6 months.  Yearly exams  See your health care team every year to talk about:  Any changes in your health.  Any medicines your care team has prescribed.  Preventive care, family planning, and ways to prevent chronic diseases.  Shots (vaccines)   HPV shots (up to age 26), if you've never had them before.  Hepatitis B shots (up to age 59), if you've never had them before.  COVID-19 shot: Get this shot when it's due.  Flu shot: Get a flu shot every year.  Tetanus shot: Get a tetanus shot every 10 years.  Pneumococcal, hepatitis A, and RSV shots: Ask your care team if you need these based on your risk.  Shingles shot (for age 50 and up)  General health tests  Diabetes screening:  Starting at age 35, Get screened for diabetes at least every 3 years.  If you are younger than age 35, ask your care team if you should be screened for diabetes.  Cholesterol test: At age 39, start having a cholesterol test every 5 years, or more often if advised.  Bone density scan (DEXA): At age 50, ask your care team if you should have this scan for osteoporosis (brittle bones).  Hepatitis C: Get tested at least once in your life.  STIs (sexually  transmitted infections)  Before age 24: Ask your care team if you should be screened for STIs.  After age 24: Get screened for STIs if you're at risk. You are at risk for STIs (including HIV) if:  You are sexually active with more than one person.  You don't use condoms every time.  You or a partner was diagnosed with a sexually transmitted infection.  If you are at risk for HIV, ask about PrEP medicine to prevent HIV.  Get tested for HIV at least once in your life, whether you are at risk for HIV or not.  Cancer screening tests  Cervical cancer screening: If you have a cervix, begin getting regular cervical cancer screening tests starting at age 21.  Breast cancer scan (mammogram): If you've ever had breasts, begin having regular mammograms starting at age 40. This is a scan to check for breast cancer.  Colon cancer screening: It is important to start screening for colon cancer at age 45.  Have a colonoscopy test every 10 years (or more often if you're at risk) Or, ask your provider about stool tests like a FIT test every year or Cologuard test every 3 years.  To learn more about your testing options, visit:   .  For help making a decision, visit:   https://bit.ly/uh06950.  Prostate cancer screening test: If you have a prostate, ask your care team if a prostate cancer screening test (PSA) at age 55 is right for you.  Lung cancer screening: If you are a current or former smoker ages 50 to 80, ask your care team if ongoing lung cancer screenings are right for you.  For informational purposes only. Not to replace the advice of your health care provider. Copyright   2023 [employer]. All rights reserved. Clinically reviewed by the [facility]. [employer] [id number] - REV 01/24.

## 2025-06-12 NOTE — PROGRESS NOTES
Preventive Care Visit  [facility] [place]  Wilder Norberto Parsons PA-C, Family Medicine  Jun 12, 2025      Assessment & Plan     Routine general medical examination at a health care facility  Other than below, stable wellness visit.  Discussed below in detail    Need for hepatitis C screening test    - Hepatitis C Screen Reflex to HCV RNA Quant and Genotype; Future  - Hepatitis C Screen Reflex to HCV RNA Quant and Genotype    Immunity status testing  Going to [facility].  Unknown vaccine status.  Will check antibody and if negative given upcoming move would recommend updating  - Hepatitis B Surface Antibody; Future  - Hepatitis B Surface Antibody    Type 2 diabetes mellitus without complication, without long-term current use of insulin (H)  Past history.  A1c pending.  CGM estimated A1c is worsened likely due to lifestyle diet changes.  However appears to be likely acceptable.  Will continue to focus on dietary habits and keep medication management the same.  Will recheck LDL today.  Supratherapeutic slightly at last visit.  If persistently elevated would recommend statin.  Likely Lipitor 10 mg to get LDL goal under 70.  Will await LDL return.  Metabolic panel pending as well.  - Hemoglobin A1c; Future  - LDL cholesterol direct; Future  - Comprehensive metabolic panel (BMP + Alb, Alk Phos, ALT, AST, Total. Bili, TP); Future  - Hemoglobin A1c  - LDL cholesterol direct  - Comprehensive metabolic panel (BMP + Alb, Alk Phos, ALT, AST, Total. Bili, TP)  Medication use and side effects discussed with the patient. Patient is in complete understanding and agreement with plan.     Hypothyroidism, unspecified type  Past history.  Dose change.  Will recheck levels today.  If stabilized, maintain current regimen and recheck annually.  - TSH with free T4 reflex; Future  - TSH with free T4 reflex    Elevated BP without diagnosis of hypertension  Noted today.  Has not have cuff at home.  DME given to obtain cuff.  " Will reach out to patient in approximately 2 weeks.  If persistently elevated, would consider medication management.  Given history of diabetes, telmisartan would be ideal but would need recheck BMP 1 month after starting this.  - Home Blood Pressure Monitor Order for DME - ONLY FOR DME    Patient has been advised of split billing requirements and indicates understanding: Yes        BMI  Estimated body mass index is 38.21 kg/m  as calculated from the following:    Height as of this encounter: 1.74 m (5' 8.5\").    Weight as of this encounter: 115.7 kg (255 lb).   Weight management plan: Discussed healthy diet and exercise guidelines    Counseling  Appropriate preventive services were addressed with this patient via screening, questionnaire, or discussion as appropriate for fall prevention, nutrition, physical activity, Tobacco-use cessation, social engagement, weight loss and cognition.  Checklist reviewing preventive services available has been given to the patient.  Reviewed patient's diet, addressing concerns and/or questions.   She is at risk for lack of exercise and has been provided with information to increase physical activity for the benefit of her well-being.       Subjective   Ingrid is a 65 year old, presenting for the following:  Physical        6/12/2025     9:16 AM   Additional Questions   Roomed by Анна FOSTER CMA   Accompanied by Self          HPIPatient is a 65-year-old female with a past history of type 2 diabetes, depression, neuroendocrine tumor of the pancreas, hypothyroidism.  She presents today for annual wellness.  Current PCP is on maternity leave and patient is planning on leaving in 3 weeks full-time to live in [place] [facility] beds at the [facility] with .  She would like her evaluation of her 6-month follow-up as well as her wellness visit    Has no significant concerns today.  Since last visit, her thyroid medication was changed from 200 mcg to 175 mcg due to hyperactivity on testing.  Has no symptoms of hypo " or hyperactive thyroid.  Taking medication as prescribed    In regards to diabetes, stable on current regimen however states has not been eating as well lately due to stress of upcoming move.  States her CGM states her estimated A1c is 7.2.  6.5% was her last A1c at last visit    In regards to her neuroendocrine pancreatic tumor, followed by oncology.  Last seen in March.  Plan was to continue 6-month CT monitoring.  Patient does not currently have oncologist set up in [facility] but plans on doing so as soon as she moves there.     Advance Care Planning    Discussed advance care planning with patient; however, patient declined at this time.        6/8/2025   General Health   How would you rate your overall physical health? Good   Feel stress (tense, anxious, or unable to sleep) Only a little   (!) STRESS CONCERN      6/8/2025   Nutrition   Three or more servings of calcium each day? Yes   Diet: Diabetic   How many servings of fruit and vegetables per day? (!) 2-3   How many sweetened beverages each day? 0-1         6/8/2025   Exercise   Days per week of moderate/strenous exercise 3 days   Average minutes spent exercising at this level 20 min         6/8/2025   Social Factors   Frequency of gathering with friends or relatives Once a week   Worry food won't last until get money to buy more No   Food not last or not have enough money for food? No   Do you have housing? (Housing is defined as stable permanent housing and does not include staying outside in a car, in a tent, in an abandoned building, in an overnight shelter, or couch-surfing.) Yes   Are you worried about losing your housing? No   Lack of transportation? No   Unable to get utilities (heat,electricity)? No         6/8/2025   Fall Risk   Fallen 2 or more times in the past year? No   Trouble with walking or balance? No          6/8/2025   Dental   Dentist two times every year? Yes                 6/8/2025   Substance Use   Alcohol more than 3/day or more than  "7/wk No   Do you use any other substances recreationally? No     Social History     Tobacco Use    Smoking status: Never     Passive exposure: Never    Smokeless tobacco: Never   Vaping Use    Vaping status: Never Used   Substance Use Topics    Alcohol use: Yes     Alcohol/week: 1.0 - 2.0 standard drink of alcohol     Types: 1 Standard drinks or equivalent per week     Comment: One or two drinks per month    Drug use: No          Mammogram Screening - Mammogram every 1-2 years updated in Health Maintenance based on mutual decision making          6/8/2025   One time HIV Screening   Previous HIV test? Yes         6/8/2025   STI Screening   New sexual partner(s) since last STI/HIV test? No     History of abnormal Pap smear: Has not had obtained.  Offered today but denied.  Plans on obtaining while in [facility] as well as mammogram and colon cancer screening.       ASCVD Risk   The 10-year ASCVD risk score (Zunilda KEENAN, et al., 2019) is: 10.1%    Values used to calculate the score:      Age: 65 years      Sex: Female      Is Non- : No      Diabetic: Yes      Tobacco smoker: No      Systolic Blood Pressure: 147 mmHg      Is BP treated: No      HDL Cholesterol: 41 mg/dL      Total Cholesterol: 135 mg/dL           Reviewed and updated as needed this visit by Provider   Tobacco  Allergies  Meds  Problems  Med Hx  Surg Hx  Fam Hx                     Objective    Exam  BP (!) 147/75 (BP Location: Left arm, Patient Position: Sitting, Cuff Size: Adult Large)   Pulse 71   Temp 98.3  F (36.8  C) (Oral)   Resp 20   Ht 1.74 m (5' 8.5\")   Wt 115.7 kg (255 lb)   LMP  (LMP Unknown)   SpO2 99%   BMI 38.21 kg/m     Estimated body mass index is 38.21 kg/m  as calculated from the following:    Height as of this encounter: 1.74 m (5' 8.5\").    Weight as of this encounter: 115.7 kg (255 lb).    Physical Exam  GENERAL: alert and no distress  EYES: Eyes grossly normal to inspection, PERRL and " conjunctivae and sclerae normal  HENT: ear canals and TM's normal, nose and mouth without ulcers or lesions  NECK: no adenopathy, no asymmetry, masses, or scars  RESP: lungs clear to auscultation - no rales, rhonchi or wheezes  CV: regular rate and rhythm, normal S1 S2, no S3 or S4, no murmur, click or rub, no peripheral edema  ABDOMEN: soft, nontender, no hepatosplenomegaly, no masses and bowel sounds normal  MS: no gross musculoskeletal defects noted, no edema  SKIN: no suspicious lesions or rashes  NEURO: Normal strength and tone, mentation intact and speech normal  PSYCH: mentation appears normal, affect normal/bright  LYMPH: no cervical adenopathy        Signed Electronically by: Wilder Parsons PA-C  The longitudinal plan of care for the diagnosis(es)/condition(s) as documented were addressed during this visit. Due to the added complexity in care, will continue to support Ingrid in the subsequent management and with ongoing continuity of care.

## 2025-06-16 ENCOUNTER — MYC MEDICAL ADVICE (OUTPATIENT)
Dept: FAMILY MEDICINE | Facility: CLINIC | Age: 65
End: 2025-06-16
Payer: COMMERCIAL

## 2025-06-16 DIAGNOSIS — E11.9 TYPE 2 DIABETES MELLITUS WITHOUT COMPLICATION, WITHOUT LONG-TERM CURRENT USE OF INSULIN (H): ICD-10-CM

## 2025-06-16 DIAGNOSIS — E03.9 HYPOTHYROIDISM, UNSPECIFIED TYPE: ICD-10-CM

## 2025-06-18 RX ORDER — METFORMIN HYDROCHLORIDE 500 MG/1
1000 TABLET, EXTENDED RELEASE ORAL
Qty: 180 TABLET | Refills: 1 | Status: SHIPPED | OUTPATIENT
Start: 2025-06-18

## 2025-06-18 RX ORDER — LEVOTHYROXINE SODIUM 175 MCG
175 TABLET ORAL DAILY
Qty: 90 TABLET | Refills: 1 | Status: SHIPPED | OUTPATIENT
Start: 2025-06-18

## 2025-06-18 RX ORDER — ACYCLOVIR 400 MG/1
TABLET ORAL
Qty: 9 EACH | Refills: 5 | Status: SHIPPED | OUTPATIENT
Start: 2025-06-18

## 2025-08-12 ENCOUNTER — TELEPHONE (OUTPATIENT)
Dept: ONCOLOGY | Facility: HOSPITAL | Age: 65
End: 2025-08-12
Payer: COMMERCIAL

## 2025-08-19 ENCOUNTER — PATIENT OUTREACH (OUTPATIENT)
Dept: ONCOLOGY | Facility: HOSPITAL | Age: 65
End: 2025-08-19
Payer: COMMERCIAL

## (undated) DEVICE — ENDO BITE BLOCK ADULT OMNI-BLOC

## (undated) DEVICE — SOL WATER IRRIG 1000ML BOTTLE 2F7114

## (undated) DEVICE — ENDO TUBING CO2 SMARTCAP STERILE DISP 100145CO2EXT

## (undated) DEVICE — KIT CONNECTOR FOR OLYMPUS ENDOSCOPES DEFENDO 100310

## (undated) DEVICE — PACK ENDOSCOPY GI CUSTOM UMMC

## (undated) DEVICE — SUCTION MANIFOLD NEPTUNE 2 SYS 4 PORT 0702-020-000

## (undated) DEVICE — PAD CHUX UNDERPAD 23X24" 7136

## (undated) DEVICE — KIT ENDO FIRST STEP DISINFECTANT 200ML W/POUCH EP-4

## (undated) DEVICE — ENDO PROBE COVER ULTRASOUND BALLOON LATEX  MAJ-249

## (undated) DEVICE — TUBING SUCTION 10'X3/16" N510

## (undated) DEVICE — ENDO NDL BALL TIP ULTRASOUND 25GA ECHO-25

## (undated) RX ORDER — PROPOFOL 10 MG/ML
INJECTION, EMULSION INTRAVENOUS
Status: DISPENSED
Start: 2024-07-19

## (undated) RX ORDER — FENTANYL CITRATE 50 UG/ML
INJECTION, SOLUTION INTRAMUSCULAR; INTRAVENOUS
Status: DISPENSED
Start: 2024-07-19

## (undated) RX ORDER — ONDANSETRON 2 MG/ML
INJECTION INTRAMUSCULAR; INTRAVENOUS
Status: DISPENSED
Start: 2024-07-19